# Patient Record
Sex: FEMALE | Race: WHITE | Employment: OTHER | URBAN - METROPOLITAN AREA
[De-identification: names, ages, dates, MRNs, and addresses within clinical notes are randomized per-mention and may not be internally consistent; named-entity substitution may affect disease eponyms.]

---

## 2022-08-14 ENCOUNTER — HOSPITAL ENCOUNTER (INPATIENT)
Age: 87
LOS: 5 days | Discharge: NURSING, REHAB OR LONG-TERM CARE | DRG: 378 | End: 2022-08-19
Attending: INTERNAL MEDICINE | Admitting: INTERNAL MEDICINE
Payer: MEDICARE

## 2022-08-14 ENCOUNTER — ANESTHESIA (OUTPATIENT)
Dept: INPATIENT UNIT | Age: 87
End: 2022-08-14

## 2022-08-14 ENCOUNTER — ANESTHESIA EVENT (OUTPATIENT)
Dept: INPATIENT UNIT | Age: 87
End: 2022-08-14

## 2022-08-14 DIAGNOSIS — K92.2 GASTROINTESTINAL HEMORRHAGE, UNSPECIFIED GASTROINTESTINAL HEMORRHAGE TYPE: ICD-10-CM

## 2022-08-14 DIAGNOSIS — S24.102D: Primary | ICD-10-CM

## 2022-08-14 DIAGNOSIS — S22.029D: Primary | ICD-10-CM

## 2022-08-14 PROBLEM — E78.5 HYPERLIPIDEMIA: Status: ACTIVE | Noted: 2022-08-14

## 2022-08-14 PROBLEM — K92.1 MELENA: Status: ACTIVE | Noted: 2022-08-14

## 2022-08-14 PROBLEM — D62 ANEMIA ASSOCIATED WITH ACUTE BLOOD LOSS: Status: ACTIVE | Noted: 2022-08-14

## 2022-08-14 PROBLEM — E11.65 UNCONTROLLED TYPE 2 DIABETES MELLITUS WITH HYPERGLYCEMIA (HCC): Status: ACTIVE | Noted: 2022-08-14

## 2022-08-14 LAB
ANION GAP SERPL CALCULATED.3IONS-SCNC: 9 MMOL/L (ref 7–16)
BASOPHILS ABSOLUTE: 0.05 E9/L (ref 0–0.2)
BASOPHILS RELATIVE PERCENT: 0.5 % (ref 0–2)
BUN BLDV-MCNC: 49 MG/DL (ref 6–23)
CALCIUM SERPL-MCNC: 8.2 MG/DL (ref 8.6–10.2)
CHLORIDE BLD-SCNC: 102 MMOL/L (ref 98–107)
CO2: 23 MMOL/L (ref 22–29)
CREAT SERPL-MCNC: 1.1 MG/DL (ref 0.5–1)
EOSINOPHILS ABSOLUTE: 0.01 E9/L (ref 0.05–0.5)
EOSINOPHILS RELATIVE PERCENT: 0.1 % (ref 0–6)
GFR AFRICAN AMERICAN: 57
GFR NON-AFRICAN AMERICAN: 47 ML/MIN/1.73
GLUCOSE BLD-MCNC: 229 MG/DL (ref 74–99)
HCT VFR BLD CALC: 23.5 % (ref 34–48)
HEMOGLOBIN: 7.7 G/DL (ref 11.5–15.5)
IMMATURE GRANULOCYTES #: 0.04 E9/L
IMMATURE GRANULOCYTES %: 0.4 % (ref 0–5)
LYMPHOCYTES ABSOLUTE: 2.05 E9/L (ref 1.5–4)
LYMPHOCYTES RELATIVE PERCENT: 21.1 % (ref 20–42)
MCH RBC QN AUTO: 28.3 PG (ref 26–35)
MCHC RBC AUTO-ENTMCNC: 32.8 % (ref 32–34.5)
MCV RBC AUTO: 86.4 FL (ref 80–99.9)
METER GLUCOSE: 164 MG/DL (ref 74–99)
METER GLUCOSE: 217 MG/DL (ref 74–99)
METER GLUCOSE: 238 MG/DL (ref 74–99)
MONOCYTES ABSOLUTE: 0.86 E9/L (ref 0.1–0.95)
MONOCYTES RELATIVE PERCENT: 8.9 % (ref 2–12)
NEUTROPHILS ABSOLUTE: 6.7 E9/L (ref 1.8–7.3)
NEUTROPHILS RELATIVE PERCENT: 69 % (ref 43–80)
PDW BLD-RTO: 15.2 FL (ref 11.5–15)
PLATELET # BLD: 243 E9/L (ref 130–450)
PMV BLD AUTO: 8.9 FL (ref 7–12)
POTASSIUM SERPL-SCNC: 4.3 MMOL/L (ref 3.5–5)
RBC # BLD: 2.72 E12/L (ref 3.5–5.5)
SODIUM BLD-SCNC: 134 MMOL/L (ref 132–146)
WBC # BLD: 9.7 E9/L (ref 4.5–11.5)

## 2022-08-14 PROCEDURE — 36415 COLL VENOUS BLD VENIPUNCTURE: CPT

## 2022-08-14 PROCEDURE — 6360000002 HC RX W HCPCS: Performed by: NURSE PRACTITIONER

## 2022-08-14 PROCEDURE — 82962 GLUCOSE BLOOD TEST: CPT

## 2022-08-14 PROCEDURE — 2580000003 HC RX 258: Performed by: NURSE PRACTITIONER

## 2022-08-14 PROCEDURE — C9113 INJ PANTOPRAZOLE SODIUM, VIA: HCPCS | Performed by: NURSE PRACTITIONER

## 2022-08-14 PROCEDURE — A4216 STERILE WATER/SALINE, 10 ML: HCPCS | Performed by: NURSE PRACTITIONER

## 2022-08-14 PROCEDURE — 6370000000 HC RX 637 (ALT 250 FOR IP): Performed by: NURSE PRACTITIONER

## 2022-08-14 PROCEDURE — 85025 COMPLETE CBC W/AUTO DIFF WBC: CPT

## 2022-08-14 PROCEDURE — 99232 SBSQ HOSP IP/OBS MODERATE 35: CPT | Performed by: INTERNAL MEDICINE

## 2022-08-14 PROCEDURE — 80048 BASIC METABOLIC PNL TOTAL CA: CPT

## 2022-08-14 PROCEDURE — 2060000000 HC ICU INTERMEDIATE R&B

## 2022-08-14 RX ORDER — FLUOXETINE HYDROCHLORIDE 20 MG/1
40 CAPSULE ORAL DAILY
Status: DISCONTINUED | OUTPATIENT
Start: 2022-08-14 | End: 2022-08-19 | Stop reason: HOSPADM

## 2022-08-14 RX ORDER — INSULIN LISPRO 100 [IU]/ML
0-4 INJECTION, SOLUTION INTRAVENOUS; SUBCUTANEOUS
Status: DISCONTINUED | OUTPATIENT
Start: 2022-08-14 | End: 2022-08-19 | Stop reason: HOSPADM

## 2022-08-14 RX ORDER — SODIUM CHLORIDE 9 MG/ML
INJECTION, SOLUTION INTRAVENOUS PRN
Status: DISCONTINUED | OUTPATIENT
Start: 2022-08-14 | End: 2022-08-15 | Stop reason: SDUPTHER

## 2022-08-14 RX ORDER — NAPROXEN 250 MG/1
250 TABLET ORAL 2 TIMES DAILY WITH MEALS
Status: ON HOLD | COMMUNITY
End: 2022-08-19 | Stop reason: HOSPADM

## 2022-08-14 RX ORDER — GABAPENTIN 100 MG/1
100 CAPSULE ORAL 3 TIMES DAILY
Status: DISCONTINUED | OUTPATIENT
Start: 2022-08-14 | End: 2022-08-19 | Stop reason: HOSPADM

## 2022-08-14 RX ORDER — FLUOXETINE HYDROCHLORIDE 20 MG/1
40 CAPSULE ORAL DAILY
COMMUNITY

## 2022-08-14 RX ORDER — SODIUM CHLORIDE 0.9 % (FLUSH) 0.9 %
5-40 SYRINGE (ML) INJECTION EVERY 12 HOURS SCHEDULED
Status: DISCONTINUED | OUTPATIENT
Start: 2022-08-14 | End: 2022-08-15 | Stop reason: SDUPTHER

## 2022-08-14 RX ORDER — POLYETHYLENE GLYCOL 3350 17 G/17G
17 POWDER, FOR SOLUTION ORAL DAILY
COMMUNITY

## 2022-08-14 RX ORDER — LOSARTAN POTASSIUM 50 MG/1
100 TABLET ORAL DAILY
Status: DISCONTINUED | OUTPATIENT
Start: 2022-08-14 | End: 2022-08-15

## 2022-08-14 RX ORDER — ACETAMINOPHEN 325 MG/1
650 TABLET ORAL EVERY 6 HOURS PRN
Status: DISCONTINUED | OUTPATIENT
Start: 2022-08-14 | End: 2022-08-14

## 2022-08-14 RX ORDER — MECLIZINE HYDROCHLORIDE CHEWABLE TABLETS 25 MG/1
25 TABLET, CHEWABLE ORAL AS NEEDED
COMMUNITY

## 2022-08-14 RX ORDER — GABAPENTIN 100 MG/1
100 CAPSULE ORAL 3 TIMES DAILY
COMMUNITY

## 2022-08-14 RX ORDER — ACETAMINOPHEN 650 MG/1
650 SUPPOSITORY RECTAL EVERY 6 HOURS PRN
Status: DISCONTINUED | OUTPATIENT
Start: 2022-08-14 | End: 2022-08-14

## 2022-08-14 RX ORDER — ACETAMINOPHEN 325 MG/1
650 TABLET ORAL EVERY 4 HOURS PRN
Status: DISCONTINUED | OUTPATIENT
Start: 2022-08-14 | End: 2022-08-19 | Stop reason: HOSPADM

## 2022-08-14 RX ORDER — ATORVASTATIN CALCIUM 40 MG/1
40 TABLET, FILM COATED ORAL DAILY
COMMUNITY

## 2022-08-14 RX ORDER — BUMETANIDE 0.5 MG/1
0.5 TABLET ORAL DAILY
COMMUNITY

## 2022-08-14 RX ORDER — SODIUM CHLORIDE 0.9 % (FLUSH) 0.9 %
5-40 SYRINGE (ML) INJECTION PRN
Status: DISCONTINUED | OUTPATIENT
Start: 2022-08-14 | End: 2022-08-15 | Stop reason: SDUPTHER

## 2022-08-14 RX ORDER — INSULIN LISPRO 100 [IU]/ML
0-4 INJECTION, SOLUTION INTRAVENOUS; SUBCUTANEOUS NIGHTLY
Status: DISCONTINUED | OUTPATIENT
Start: 2022-08-14 | End: 2022-08-19 | Stop reason: HOSPADM

## 2022-08-14 RX ORDER — LOSARTAN POTASSIUM 100 MG/1
100 TABLET ORAL DAILY
COMMUNITY

## 2022-08-14 RX ORDER — ATORVASTATIN CALCIUM 40 MG/1
40 TABLET, FILM COATED ORAL DAILY
Status: DISCONTINUED | OUTPATIENT
Start: 2022-08-14 | End: 2022-08-19 | Stop reason: HOSPADM

## 2022-08-14 RX ORDER — DEXTROSE MONOHYDRATE 100 MG/ML
INJECTION, SOLUTION INTRAVENOUS CONTINUOUS PRN
Status: DISCONTINUED | OUTPATIENT
Start: 2022-08-14 | End: 2022-08-19 | Stop reason: HOSPADM

## 2022-08-14 RX ORDER — POLYETHYLENE GLYCOL 3350 17 G/17G
17 POWDER, FOR SOLUTION ORAL DAILY PRN
Status: DISCONTINUED | OUTPATIENT
Start: 2022-08-14 | End: 2022-08-19 | Stop reason: HOSPADM

## 2022-08-14 RX ADMIN — SODIUM CHLORIDE 40 MG: 9 INJECTION INTRAMUSCULAR; INTRAVENOUS; SUBCUTANEOUS at 20:31

## 2022-08-14 RX ADMIN — GABAPENTIN 100 MG: 100 CAPSULE ORAL at 09:51

## 2022-08-14 RX ADMIN — GABAPENTIN 100 MG: 100 CAPSULE ORAL at 20:31

## 2022-08-14 RX ADMIN — INSULIN LISPRO 1 UNITS: 100 INJECTION, SOLUTION INTRAVENOUS; SUBCUTANEOUS at 11:28

## 2022-08-14 RX ADMIN — SODIUM CHLORIDE, PRESERVATIVE FREE 10 ML: 5 INJECTION INTRAVENOUS at 20:35

## 2022-08-14 RX ADMIN — ACETAMINOPHEN 650 MG: 325 TABLET ORAL at 09:52

## 2022-08-14 RX ADMIN — GABAPENTIN 100 MG: 100 CAPSULE ORAL at 15:18

## 2022-08-14 RX ADMIN — ACETAMINOPHEN 650 MG: 325 TABLET ORAL at 20:31

## 2022-08-14 RX ADMIN — SODIUM CHLORIDE 40 MG: 9 INJECTION INTRAMUSCULAR; INTRAVENOUS; SUBCUTANEOUS at 11:28

## 2022-08-14 RX ADMIN — SODIUM CHLORIDE, PRESERVATIVE FREE 10 ML: 5 INJECTION INTRAVENOUS at 09:52

## 2022-08-14 RX ADMIN — DESMOPRESSIN ACETATE 40 MG: 0.2 TABLET ORAL at 09:52

## 2022-08-14 RX ADMIN — LOSARTAN POTASSIUM 100 MG: 50 TABLET, FILM COATED ORAL at 09:52

## 2022-08-14 RX ADMIN — FLUOXETINE HYDROCHLORIDE 40 MG: 20 CAPSULE ORAL at 09:51

## 2022-08-14 RX ADMIN — INSULIN LISPRO 1 UNITS: 100 INJECTION, SOLUTION INTRAVENOUS; SUBCUTANEOUS at 16:13

## 2022-08-14 RX ADMIN — SODIUM CHLORIDE, PRESERVATIVE FREE 10 ML: 5 INJECTION INTRAVENOUS at 10:59

## 2022-08-14 ASSESSMENT — PAIN SCALES - GENERAL
PAINLEVEL_OUTOF10: 5
PAINLEVEL_OUTOF10: 0
PAINLEVEL_OUTOF10: 3
PAINLEVEL_OUTOF10: 0

## 2022-08-14 ASSESSMENT — PAIN DESCRIPTION - LOCATION: LOCATION: BACK;LEG

## 2022-08-14 NOTE — CONSULTS
Gastroenterology Consult Note   Sally WONG, NP-C with Darren Bernheim, M.D. Consult Note        Date of Service: 8/14/2022  Reason for Consult: GIB  Requesting Physician: Luke Diego    CHIEF COMPLAINT:  recent falls    History Obtained From:  patient, family member - son at [facility], [address] medical record    HISTORY OF PRESENT ILLNESS:       Shirlene Bennett is a 80 y.o. female with significant past medical history of anxiety, dementia, DM2, HTN, GERD, HLD, and vertigo direct admit from [facility] AC[facility] for multiple falls at [facility]. Pt reports to recent falls. States she has been having dark stools for the last several months. Describes the stools as \"loose, black\". States her bowels are always on the looser side. Denies any prior episodes of such. With a good appetite, denies any recent weight loss. Denies any fever and/or chills. Also denies any abdominal pain. Patient has been taking Naproxen 250 MG BID at [facility]. Prior EGD years ago, uncertain of any details or whom completes this. Last colon over 5 years ago, polyps removed. Denies any FMHx of colon cancer. Admission labs pending. Consultation for GIB. Currently, pt reports to feeling sore, wanting to get out of the bed. Has been NPO.   Labs today pending    Past Medical History:        Diagnosis Date    Anxiety     Dementia ([facility] [address]     DM II (diabetes mellitus, type II), controlled ([facility] [address]     Essential (primary) hypertension     GERD (gastroesophageal reflux disease)     HLD (hyperlipidemia)     Vertigo      Past Surgical History:        Procedure Laterality Date    BREAST SURGERY Left     ORIF FEMUR FRACTURE Right      Current Medications:    Current Facility-Administered Medications: atorvastatin (LIPITOR) tablet 40 mg, 40 mg, Oral, Daily  FLUoxetine (PROZAC) capsule 40 mg, 40 mg, Oral, Daily  gabapentin (NEURONTIN) capsule 100 mg, 100 mg, Oral, TID  losartan (COZAAR) tablet 100 mg, 100 mg, Oral, Daily  sodium chloride flush 0.9 % injection 5-40 mL, 5-40 mL, IntraVENous, 2 times per day  sodium chloride flush 0.9 % injection 5-40 mL, 5-40 mL, IntraVENous, PRN  0.9 % sodium chloride infusion, , IntraVENous, PRN  polyethylene glycol (GLYCOLAX) packet 17 g, 17 g, Oral, Daily PRN  glucose chewable tablet 16 g, 4 tablet, Oral, PRN  dextrose bolus 10% 125 mL, 125 mL, IntraVENous, PRN **OR** dextrose bolus 10% 250 mL, 250 mL, IntraVENous, PRN  glucagon (rDNA) injection 1 mg, 1 mg, SubCUTAneous, PRN  dextrose 10 % infusion, , IntraVENous, Continuous PRN  insulin lispro (HUMALOG) injection vial 0-4 Units, 0-4 Units, SubCUTAneous, TID WC  insulin lispro (HUMALOG) injection vial 0-4 Units, 0-4 Units, SubCUTAneous, Nightly  acetaminophen (TYLENOL) tablet 650 mg, 650 mg, Oral, Q4H PRN    Allergies:  Codeine, Demerol hcl [meperidine], Hydrocodone-acetaminophen, and Percocet [oxycodone-acetaminophen]    Social History:  Patient denies any ETOH, tobacco or drug use      Family History:   Father- D/ heart issues  Mother- D/ MVA  2 brothers- D/ uncertain of causes  2 brothers- A/H  2 boys- A/H  2 daughters- 1  from lung & brain CA; other A/H    REVIEW OF SYSTEMS:    Aside from what was mentioned in the PMH and HPI, essentially unremarkable, all others negative. PHYSICAL EXAM:      Vitals:    BP (!) 166/64   Pulse 67   Temp 98.3 °F (36.8 °C) (Oral)   Resp 18   SpO2 97%       CONSTITUTIONAL:  awake, alert, cooperative, no apparent distress, and appears stated age, ill-appearing  EYES:  pupils equal, round and reactive to light, sclera anicteric and conjunctiva normal  ENT:  normocephalic, oral pharynx with moist mucous membranes  LUNGS:  clear/ diminished to auscultation bilaterally.   CARDIOVASCULAR:   regular rate and rhythm, no murmur noted; 2+ pulses; no edema  ABDOMEN:  normal bowel sounds, soft, non-distended, epigastric tenderness to palpitation, no guarding or rebound  NEUROLOGIC:  Mental Status Exam:  Level of Alertness:   awake  Orientation:   person, place, time  Motor Exam:  Motor exam is symmetrical 5 out of 5 all extremities bilaterally  SKIN:  pale skin color, texture, turgor    DATA:    Labs ordered. CT A/P- no pathology noted- per [facility]    IMPRESSION:    Melanotic stools- +occult at [facility]  Anemia- reportedly hemoglobin 5.5 at [facility]  Epigastric pain  Excess NSAID use  PMHx of colon polyps  ASYA mass- per CT from [facility]  Recent falls    RECOMMENDATIONS:      Protonix 40 MG IV BID  AM labs pending  Diet as tolerated for today  NPO P MN  EGD tomorrow with Dr. Parker Delgado. Procedure details for EGD discussed in detail. Complications including but not limited to, perforation, bleeding and infection were discussed in great detail. Risks, benefits, and alternatives explained. Pt has understood the information and has agreed to proceed. Serial H&H, transfuse >7 per Primary Care  Supportive care  Trend labs   Will follow    Thank you very much for your consultation. We will follow closely with you.     Discussed with Dr. Chrissie Ramon developed by Dr. Kale Urbina, NP-C 8/14/2022 9:00 AM for Dr. Parker Delgado

## 2022-08-14 NOTE — ANESTHESIA PRE PROCEDURE
Department of Anesthesiology  Preprocedure Note       Name:  Fauzia Manning   Age:  80 y.o.  :  1933                                          MRN:  [id number]         Date:  2022      Surgeon: * No surgeons listed *    Procedure: * No procedures listed *    Medications prior to admission:   Prior to Admission medications    Medication Sig Start Date End Date Taking? Authorizing Provider   atorvastatin (LIPITOR) 40 MG tablet Take 40 mg by mouth in the morning. At bedtime. Yes Historical Provider, MD   bumetanide (BUMEX) 0.5 MG tablet Take 0.5 mg by mouth in the morning. In the morning. Yes Historical Provider, MD   FLUoxetine (PROZAC) 20 MG capsule Take 40 mg by mouth in the morning. In the morning. Yes Historical Provider, MD   polyethylene glycol (GLYCOLAX) 17 GM/SCOOP powder Take 17 g by mouth daily   Yes Historical Provider, MD   losartan (COZAAR) 100 MG tablet Take 100 mg by mouth in the morning. In the morning. Yes Historical Provider, MD   Cholecalciferol (VITAMIN D3) 125 MCG (5000 UT) TABS Take by mouth once a week   Yes Historical Provider, MD   naproxen (NAPROSYN) 250 MG tablet Take 250 mg by mouth in the morning and 250 mg in the evening. Take with meals. Yes Historical Provider, MD   gabapentin (NEURONTIN) 100 MG capsule Take 100 mg by mouth in the morning and 100 mg at noon and 100 mg before bedtime.    Yes Historical Provider, MD   meclizine (ANTIVERT) 25 MG CHEW Take 25 mg by mouth as needed   Yes Historical Provider, MD       Current medications:    Current Facility-Administered Medications   Medication Dose Route Frequency Provider Last Rate Last Admin    atorvastatin (LIPITOR) tablet 40 mg  40 mg Oral Daily Floyd Nine, APRN - CNP   40 mg at 22 0952    FLUoxetine (PROZAC) capsule 40 mg  40 mg Oral Daily Floyd Nine, APRN - CNP   40 mg at 22 0928    gabapentin (NEURONTIN) capsule 100 mg  100 mg Oral TID Floyd Nine, APRN - CNP   100 mg at 08/14/22 1518    losartan (COZAAR) tablet 100 mg  100 mg Oral Daily Carmel Chavarria, APRN - CNP   100 mg at 08/14/22 5534    sodium chloride flush 0.9 % injection 5-40 mL  5-40 mL IntraVENous 2 times per day Eliarceliaa Millies, APRN - CNP   10 mL at 08/14/22 9916    sodium chloride flush 0.9 % injection 5-40 mL  5-40 mL IntraVENous PRN Eliarceliaa Millies, APRN - CNP   10 mL at 08/14/22 1059    0.9 % sodium chloride infusion   IntraVENous PRN Eliarceliaa Millies, APRN - CNP        polyethylene glycol (GLYCOLAX) packet 17 g  17 g Oral Daily PRN Sharondaa Millies, APRN - CNP        glucose chewable tablet 16 g  4 tablet Oral PRN Eliarceliaa Millies, APRN - CNP        dextrose bolus 10% 125 mL  125 mL IntraVENous PRN Eliarceliaa Millies, APRN - CNP        Or    dextrose bolus 10% 250 mL  250 mL IntraVENous PRN Sharondaa Millies, APRN - CNP        glucagon (rDNA) injection 1 mg  1 mg SubCUTAneous PRN Sharondaa Millies, APRN - CNP        dextrose 10 % infusion   IntraVENous Continuous PRN Carmel Chavarria, APRN - CNP        insulin lispro (HUMALOG) injection vial 0-4 Units  0-4 Units SubCUTAneous TID WC Sharondaa Aura, APRN - CNP   1 Units at 08/14/22 1613    insulin lispro (HUMALOG) injection vial 0-4 Units  0-4 Units SubCUTAneous Nightly Sharondaa Millies, APRN - CNP        acetaminophen (TYLENOL) tablet 650 mg  650 mg Oral Q4H PRN Sharondaa Millies, APRN - CNP   650 mg at 08/14/22 0952    pantoprazole (PROTONIX) 40 mg in sodium chloride (PF) 10 mL injection  40 mg IntraVENous BID Sally A Sugar, APRN - CNP   40 mg at 08/14/22 1128       Allergies:     Allergies   Allergen Reactions    Codeine     Demerol Hcl [Meperidine]     Hydrocodone-Acetaminophen     Percocet [Oxycodone-Acetaminophen]        Problem List:    Patient Active Problem List   Diagnosis Code    GI bleed K92.2    Melena K92.1    Anemia associated with acute blood loss D62    Uncontrolled type 2 diabetes mellitus with hyperglycemia ([facility] [address] E11.65    Hyperlipidemia E78.5       Past Medical History:        Diagnosis Date    Anxiety     [facility]     DM II (diabetes mellitus, type II), controlled ([facility] [address] [facility]     Essential (primary) hypertension     GERD (gastroesophageal reflux disease)     HLD (hyperlipidemia)     Vertigo        Past Surgical History:        Procedure Laterality Date    BREAST SURGERY Left     ORIF FEMUR FRACTURE Right        Social History:    Social History     Tobacco Use    Smoking status: Former     Types: Cigarettes    Smokeless tobacco: Never   Substance Use Topics    Alcohol use: Never                                Counseling given: Not Answered      Vital Signs (Current):   Vitals:    08/14/22 0637 08/14/22 0800 08/14/22 1359   BP: (!) 169/75 (!) 166/64 (!) 111/40   Pulse: 68 67 61   Resp: 18 18 16   Temp: 98.4 °F (36.9 °C) 98.3 °F (36.8 °C) 99.3 °F (37.4 °C)   TempSrc: Oral Oral Axillary   SpO2: 96% 97% 95%                                              BP Readings from Last 3 Encounters:   08/14/22 (!) 111/40       NPO Status:                                                                                 BMI:   Wt Readings from Last 3 Encounters:   No data found for Wt     There is no height or weight on file to calculate BMI.    CBC:   Lab Results   Component Value Date/Time    WBC 9.7 08/14/2022 10:50 AM    RBC 2.72 08/14/2022 10:50 AM    HGB 7.7 08/14/2022 10:50 AM    HCT 23.5 08/14/2022 10:50 AM    MCV 86.4 08/14/2022 10:50 AM    RDW 15.2 08/14/2022 10:50 AM     08/14/2022 10:50 AM       CMP:   Lab Results   Component Value Date/Time     08/14/2022 10:50 AM    K 4.3 08/14/2022 10:50 AM     08/14/2022 10:50 AM    CO2 23 08/14/2022 10:50 AM    BUN 49 08/14/2022 10:50 AM    CREATININE 1.1 08/14/2022 10:50 AM    GFRAA 57 08/14/2022 10:50 AM    LABGLOM 47 08/14/2022 10:50 AM    GLUCOSE 229 08/14/2022 10:50 AM    CALCIUM 8.2 08/14/2022 10:50 AM       POC Tests: No results for input(s): ASHA LOPEZ POCK,

## 2022-08-14 NOTE — H&P
[address] [facility] Hospitalist Group   History and Physical      CHIEF COMPLAINT:  Fall     History of Present Illness:  80 y.o. female with a history of Dm II, HTN, Vertigo, Dementia, and  GERD presents from [facility] following fall at [facility]. Pt complaint is moderate in severity, intermittent, worsened by nothing. Pt presented to [facility] following fall at facility 0010. VSS upon presentation to ED. Noted Nausea and Hgb 5.5. Rectal exam performed revealing melatonic fecal occult stools. Pantoprazole infusion initiated. Completed 2U PRBC. She was transferred to [address] [place] [address] For further evaluation and treatment. Pt son at bedside. Pt states she has been falling a lot recently. Last night as the final fall. She denies any dizziness, lightheadedness. States she has noticed dark stools for long time, but unsure when they exactly started. Is prescribed Naproxen 250mg BID at facility. She denies CP, SOB, fevers, Chills, N/V/D. CT A/P No acute pathology. CT Head No acute intracranial pathology. Prominent CSF spaces usually indicate volume loss/atrophy as a manifestation of mild to moderate chronic white matter ischemia. CXR 3.7cm Opacity in ASYA may be underlying mass. CT Cervical Spine No acute osseous injury. MIld Degenerative changes C5-C6. C6-C7. CTs all performed at [facility] film available in soft chart. Decision to admit pt for further evaluation and treatment. Informant(s) for H&P:Patient, Son, and EMR    REVIEW OF SYSTEMS:  Admits to falls and dark stools. Denies fevers, chills, cp, sob, n/v, ha, vision/hearing changes, wt changes, hot/cold flashes, other open skin lesions, diarrhea, constipation, dysuria/hematuria unless noted in HPI. Complete ROS performed with the patient and is otherwise negative.       PMH:  Past Medical History:   Diagnosis Date    Anxiety     Dementia ([facility] [address]     DM II (diabetes mellitus, type II), controlled ([address]     Essential (primary) hypertension     GERD (gastroesophageal reflux disease)     HLD (hyperlipidemia)     Vertigo        Surgical History:  Past Surgical History:   Procedure Laterality Date    BREAST SURGERY Left     ORIF FEMUR FRACTURE Right        Medications Prior to Admission:    Prior to Admission medications    Medication Sig Start Date End Date Taking? Authorizing Provider   atorvastatin (LIPITOR) 40 MG tablet Take 40 mg by mouth in the morning. At bedtime. Yes Historical Provider, MD   bumetanide (BUMEX) 0.5 MG tablet Take 0.5 mg by mouth in the morning. In the morning. Yes Historical Provider, MD   FLUoxetine (PROZAC) 20 MG capsule Take 40 mg by mouth in the morning. In the morning. Yes Historical Provider, MD   polyethylene glycol (GLYCOLAX) 17 GM/SCOOP powder Take 17 g by mouth daily   Yes Historical Provider, MD   losartan (COZAAR) 100 MG tablet Take 100 mg by mouth in the morning. In the morning. Yes Historical Provider, MD   Cholecalciferol (VITAMIN D3) 125 MCG (5000 UT) TABS Take by mouth once a week   Yes Historical Provider, MD   naproxen (NAPROSYN) 250 MG tablet Take 250 mg by mouth in the morning and 250 mg in the evening. Take with meals. Yes Historical Provider, MD   gabapentin (NEURONTIN) 100 MG capsule Take 100 mg by mouth in the morning and 100 mg at noon and 100 mg before bedtime. Yes Historical Provider, MD   meclizine (ANTIVERT) 25 MG CHEW Take 25 mg by mouth as needed   Yes Historical Provider, MD       Allergies:    Codeine, Demerol hcl [meperidine], Hydrocodone-acetaminophen, and Percocet [oxycodone-acetaminophen]    Social History:    reports that she has quit smoking. Her smoking use included cigarettes. She has never used smokeless tobacco. She reports that she does not drink alcohol and does not use drugs. Family History:   family history includes Other in her son. Son with noted Factor V Leiden.  Reviewed and updated with pt and son    PHYSICAL EXAM:  Vitals:  BP (!) 166/64   Pulse 67   Temp 98.3 °F (36.8 °C) (Oral)   Resp 18   SpO2 97%     General Appearance: alert and oriented to person, place and time and in no acute distress  Skin: warm and dry  Head: normocephalic and atraumatic  Eyes: pupils equal, round, and reactive to light, extraocular eye movements intact, conjunctivae normal  Neck: neck supple and non tender without mass   Pulmonary/Chest: clear to auscultation bilaterally- no wheezes, rales or rhonchi, normal air movement, no respiratory distress  Cardiovascular: normal rate, normal S1 and S2 and no RGM  Abdomen: soft, non-tender, non-distended, normal bowel sounds  Extremities: no cyanosis, no clubbing and no edema  Neurologic: no cranial nerve deficit and speech normal    LABS:  No results for input(s): NA, K, CL, CO2, BUN, CREATININE, GLUCOSE, CALCIUM in the last 72 hours. No results for input(s): WBC, RBC, HGB, HCT, MCV, MCH, MCHC, RDW, PLT, MPV in the last 72 hours. No results for input(s): POCGLU in the last 72 hours. Radiology:   CT A/P  Impression: No acute pathology. CT Head Impression: No acute intracranial pathology. Prominent CSF spaces usually indicate volume loss/atrophy as a manifestation of mild to moderate chronic white matter ischemia. CXR Impression: 3.7cm Opacity in ASYA may be underlying mass. CT Cervical Spine Impression:  No acute osseous injury. MIld Degenerative changes C5-C6. C6-C7. CTs all performed at [facility] film available in soft chart. EKG: None    ASSESSMENT:      Principal Problem:    GI bleed  Resolved Problems:    * No resolved hospital problems. *      PLAN:    GI Bleed- Noted  dark stools for unknown length of time. Upon presentation to [place] ED Does take naproxen 250mg BID. Hgb 5.5. Received 2U PRBC. Pantoprazole infusion initiated. Transferred to [facility] for specialty evaluation. Trend H/H. Continue Pantoprazole. GI input appreciated. Recent Fall- likely 2/2 above. Pt states she has had numerous falls.  Does have Hx vertigo, however pt states she does not recall being dizzy/lightheaded at time of fall. Check orthostatic bp. PT/OT. ASYA Lung Opacity- CXR 3.7cm Opacity in ASYA may be underlying mass. Labs pending. Consider Pulmonology input. HTN- Continue losartan. Holding Bumetanide. DM II- Check A1C. SSI/Hypoglycemic protocol/POCGT/CLD. Resume Carb control diet when ok with GI Follow adjust as needed. HLD- Atorvastatin. Code Status: CCA  DVT prophylaxis: SCDs    NOTE: This report was transcribed using voice recognition software. Every effort was made to ensure accuracy; however, inadvertent computerized transcription errors may be present. Electronically signed by Krish Alcantar CNP on 8/14/2022 at 9:51 AM  HOSPITALIST ATTENDING PHYSICIAN NOTE 8/14/2022 1504PM:    Details of the evaluation - subjective assessment (including medication profile, past medical, family and social history when applicable), examination, review of lab and test data, diagnostic impressions and medical decision making - performed by Aníbal Walter. Calderon Alcantar CNP, were discussed with me on the date of service and I agree with clinical information herein unless otherwise noted. The patient has been evaluated by me personally earlier today. Pt reports no fevers, chills,n/v. PHYSICAL EXAM:    Vitals:  BP (!) 111/40   Pulse 61   Temp 99.3 °F (37.4 °C) (Axillary)   Resp 16   SpO2 95%     General:  Appears comfortable. Answers questions appropriately and cooperative with exam  HEENT:  Mucous membranes moist. No erythema, rhinorrhea, or post-nasal drip noted. Neck:  No carotid bruits. Heart:  Rhythm regular at rate of 60  Lungs:  CTA. No wheeze, rales, or rhonchi  Abdomen:  Positive bowel sounds positive. Soft. Non-tender. No guarding, rebound or rigidity. Breast/Rectal/Genitourinary: not pertinent.     Extremities:  Negative for lower extremity edema  Skin:  Warm and dry  Vascular: 2/4 Dorsalis Pedis pulses bilaterally. Neuro:  Cranial nerves 2-12 grossly intact, no focal weakness or change in sensation noted. Extraocular muscles intact. Pupils equal, round, reactive to light. I agree with the assessment and plan of Marcia Rosales. Patrecia Alton - CNP    Melena/Gi bleed  Anemia with acute blood component  Dm type 2 uncontrolled  Htn  hyperlipidemia    Electronically signed by Andrea Ireland D.O.   Hospitalist  [facility] Hospitalist Service at [facility]

## 2022-08-15 ENCOUNTER — ANESTHESIA (OUTPATIENT)
Dept: ENDOSCOPY | Age: 87
DRG: 378 | End: 2022-08-15
Payer: MEDICARE

## 2022-08-15 ENCOUNTER — ANESTHESIA EVENT (OUTPATIENT)
Dept: ENDOSCOPY | Age: 87
DRG: 378 | End: 2022-08-15
Payer: MEDICARE

## 2022-08-15 LAB
ANION GAP SERPL CALCULATED.3IONS-SCNC: 6 MMOL/L (ref 7–16)
BASOPHILS ABSOLUTE: 0.07 E9/L (ref 0–0.2)
BASOPHILS RELATIVE PERCENT: 0.8 % (ref 0–2)
BUN BLDV-MCNC: 48 MG/DL (ref 6–23)
CALCIUM SERPL-MCNC: 8.5 MG/DL (ref 8.6–10.2)
CHLORIDE BLD-SCNC: 105 MMOL/L (ref 98–107)
CO2: 22 MMOL/L (ref 22–29)
CREAT SERPL-MCNC: 1.5 MG/DL (ref 0.5–1)
EOSINOPHILS ABSOLUTE: 0.3 E9/L (ref 0.05–0.5)
EOSINOPHILS RELATIVE PERCENT: 3.5 % (ref 0–6)
GFR AFRICAN AMERICAN: 40
GFR NON-AFRICAN AMERICAN: 33 ML/MIN/1.73
GLUCOSE BLD-MCNC: 141 MG/DL (ref 74–99)
HCT VFR BLD CALC: 23.2 % (ref 34–48)
HEMOGLOBIN: 7.5 G/DL (ref 11.5–15.5)
IMMATURE GRANULOCYTES #: 0.03 E9/L
IMMATURE GRANULOCYTES %: 0.4 % (ref 0–5)
INR BLD: 1
LYMPHOCYTES ABSOLUTE: 1.63 E9/L (ref 1.5–4)
LYMPHOCYTES RELATIVE PERCENT: 19 % (ref 20–42)
MCH RBC QN AUTO: 29 PG (ref 26–35)
MCHC RBC AUTO-ENTMCNC: 32.3 % (ref 32–34.5)
MCV RBC AUTO: 89.6 FL (ref 80–99.9)
METER GLUCOSE: 153 MG/DL (ref 74–99)
METER GLUCOSE: 157 MG/DL (ref 74–99)
METER GLUCOSE: 171 MG/DL (ref 74–99)
METER GLUCOSE: 262 MG/DL (ref 74–99)
MONOCYTES ABSOLUTE: 0.79 E9/L (ref 0.1–0.95)
MONOCYTES RELATIVE PERCENT: 9.2 % (ref 2–12)
NEUTROPHILS ABSOLUTE: 5.74 E9/L (ref 1.8–7.3)
NEUTROPHILS RELATIVE PERCENT: 67.1 % (ref 43–80)
PDW BLD-RTO: 16 FL (ref 11.5–15)
PLATELET # BLD: 230 E9/L (ref 130–450)
PMV BLD AUTO: 8.9 FL (ref 7–12)
POTASSIUM REFLEX MAGNESIUM: 4.5 MMOL/L (ref 3.5–5)
PROTHROMBIN TIME: 11.5 SEC (ref 9.3–12.4)
RBC # BLD: 2.59 E12/L (ref 3.5–5.5)
SODIUM BLD-SCNC: 133 MMOL/L (ref 132–146)
WBC # BLD: 8.6 E9/L (ref 4.5–11.5)

## 2022-08-15 PROCEDURE — 80048 BASIC METABOLIC PNL TOTAL CA: CPT

## 2022-08-15 PROCEDURE — 7100000010 HC PHASE II RECOVERY - FIRST 15 MIN: Performed by: INTERNAL MEDICINE

## 2022-08-15 PROCEDURE — 2580000003 HC RX 258: Performed by: INTERNAL MEDICINE

## 2022-08-15 PROCEDURE — 2709999900 HC NON-CHARGEABLE SUPPLY: Performed by: INTERNAL MEDICINE

## 2022-08-15 PROCEDURE — 85025 COMPLETE CBC W/AUTO DIFF WBC: CPT

## 2022-08-15 PROCEDURE — 6370000000 HC RX 637 (ALT 250 FOR IP): Performed by: INTERNAL MEDICINE

## 2022-08-15 PROCEDURE — 6360000002 HC RX W HCPCS: Performed by: NURSE ANESTHETIST, CERTIFIED REGISTERED

## 2022-08-15 PROCEDURE — 82962 GLUCOSE BLOOD TEST: CPT

## 2022-08-15 PROCEDURE — 6360000002 HC RX W HCPCS: Performed by: INTERNAL MEDICINE

## 2022-08-15 PROCEDURE — 0DB68ZX EXCISION OF STOMACH, VIA NATURAL OR ARTIFICIAL OPENING ENDOSCOPIC, DIAGNOSTIC: ICD-10-PCS | Performed by: INTERNAL MEDICINE

## 2022-08-15 PROCEDURE — 2580000003 HC RX 258: Performed by: NURSE PRACTITIONER

## 2022-08-15 PROCEDURE — 97530 THERAPEUTIC ACTIVITIES: CPT

## 2022-08-15 PROCEDURE — 87081 CULTURE SCREEN ONLY: CPT

## 2022-08-15 PROCEDURE — C9113 INJ PANTOPRAZOLE SODIUM, VIA: HCPCS | Performed by: NURSE PRACTITIONER

## 2022-08-15 PROCEDURE — 99232 SBSQ HOSP IP/OBS MODERATE 35: CPT | Performed by: INTERNAL MEDICINE

## 2022-08-15 PROCEDURE — C9113 INJ PANTOPRAZOLE SODIUM, VIA: HCPCS | Performed by: INTERNAL MEDICINE

## 2022-08-15 PROCEDURE — 6360000002 HC RX W HCPCS: Performed by: NURSE PRACTITIONER

## 2022-08-15 PROCEDURE — 3700000000 HC ANESTHESIA ATTENDED CARE: Performed by: INTERNAL MEDICINE

## 2022-08-15 PROCEDURE — 36415 COLL VENOUS BLD VENIPUNCTURE: CPT

## 2022-08-15 PROCEDURE — 2060000000 HC ICU INTERMEDIATE R&B

## 2022-08-15 PROCEDURE — A4216 STERILE WATER/SALINE, 10 ML: HCPCS | Performed by: INTERNAL MEDICINE

## 2022-08-15 PROCEDURE — A4216 STERILE WATER/SALINE, 10 ML: HCPCS | Performed by: NURSE PRACTITIONER

## 2022-08-15 PROCEDURE — 6360000002 HC RX W HCPCS: Performed by: ANESTHESIOLOGY

## 2022-08-15 PROCEDURE — 88305 TISSUE EXAM BY PATHOLOGIST: CPT

## 2022-08-15 PROCEDURE — 7100000011 HC PHASE II RECOVERY - ADDTL 15 MIN: Performed by: INTERNAL MEDICINE

## 2022-08-15 PROCEDURE — 6360000002 HC RX W HCPCS

## 2022-08-15 PROCEDURE — 97161 PT EVAL LOW COMPLEX 20 MIN: CPT

## 2022-08-15 PROCEDURE — 85610 PROTHROMBIN TIME: CPT

## 2022-08-15 PROCEDURE — 3609012400 HC EGD TRANSORAL BIOPSY SINGLE/MULTIPLE: Performed by: INTERNAL MEDICINE

## 2022-08-15 PROCEDURE — 3700000001 HC ADD 15 MINUTES (ANESTHESIA): Performed by: INTERNAL MEDICINE

## 2022-08-15 PROCEDURE — 0DB98ZX EXCISION OF DUODENUM, VIA NATURAL OR ARTIFICIAL OPENING ENDOSCOPIC, DIAGNOSTIC: ICD-10-PCS | Performed by: INTERNAL MEDICINE

## 2022-08-15 PROCEDURE — 2580000003 HC RX 258: Performed by: ANESTHESIOLOGY

## 2022-08-15 PROCEDURE — 6370000000 HC RX 637 (ALT 250 FOR IP): Performed by: NURSE PRACTITIONER

## 2022-08-15 RX ORDER — LABETALOL HYDROCHLORIDE 5 MG/ML
5 INJECTION, SOLUTION INTRAVENOUS
Status: DISCONTINUED | OUTPATIENT
Start: 2022-08-15 | End: 2022-08-19 | Stop reason: HOSPADM

## 2022-08-15 RX ORDER — ONDANSETRON 2 MG/ML
4 INJECTION INTRAMUSCULAR; INTRAVENOUS ONCE
Status: COMPLETED | OUTPATIENT
Start: 2022-08-15 | End: 2022-08-15

## 2022-08-15 RX ORDER — ONDANSETRON 2 MG/ML
INJECTION INTRAMUSCULAR; INTRAVENOUS
Status: COMPLETED
Start: 2022-08-15 | End: 2022-08-15

## 2022-08-15 RX ORDER — SODIUM CHLORIDE 0.9 % (FLUSH) 0.9 %
5-40 SYRINGE (ML) INJECTION PRN
Status: DISCONTINUED | OUTPATIENT
Start: 2022-08-15 | End: 2022-08-19 | Stop reason: HOSPADM

## 2022-08-15 RX ORDER — PROPOFOL 10 MG/ML
INJECTION, EMULSION INTRAVENOUS PRN
Status: DISCONTINUED | OUTPATIENT
Start: 2022-08-15 | End: 2022-08-15 | Stop reason: SDUPTHER

## 2022-08-15 RX ORDER — HYDRALAZINE HYDROCHLORIDE 20 MG/ML
5 INJECTION INTRAMUSCULAR; INTRAVENOUS
Status: DISCONTINUED | OUTPATIENT
Start: 2022-08-15 | End: 2022-08-19 | Stop reason: HOSPADM

## 2022-08-15 RX ORDER — SODIUM CHLORIDE 9 MG/ML
INJECTION, SOLUTION INTRAVENOUS PRN
Status: DISCONTINUED | OUTPATIENT
Start: 2022-08-15 | End: 2022-08-19 | Stop reason: HOSPADM

## 2022-08-15 RX ORDER — FENTANYL CITRATE 50 UG/ML
25 INJECTION, SOLUTION INTRAMUSCULAR; INTRAVENOUS EVERY 5 MIN PRN
Status: DISCONTINUED | OUTPATIENT
Start: 2022-08-15 | End: 2022-08-19

## 2022-08-15 RX ORDER — PROCHLORPERAZINE EDISYLATE 5 MG/ML
5 INJECTION INTRAMUSCULAR; INTRAVENOUS
Status: ACTIVE | OUTPATIENT
Start: 2022-08-15 | End: 2022-08-15

## 2022-08-15 RX ORDER — DIPHENHYDRAMINE HYDROCHLORIDE 50 MG/ML
12.5 INJECTION INTRAMUSCULAR; INTRAVENOUS
Status: ACTIVE | OUTPATIENT
Start: 2022-08-15 | End: 2022-08-15

## 2022-08-15 RX ORDER — SODIUM CHLORIDE 0.9 % (FLUSH) 0.9 %
5-40 SYRINGE (ML) INJECTION EVERY 12 HOURS SCHEDULED
Status: DISCONTINUED | OUTPATIENT
Start: 2022-08-15 | End: 2022-08-19 | Stop reason: HOSPADM

## 2022-08-15 RX ORDER — SODIUM CHLORIDE 9 MG/ML
INJECTION, SOLUTION INTRAVENOUS CONTINUOUS
Status: DISCONTINUED | OUTPATIENT
Start: 2022-08-15 | End: 2022-08-19

## 2022-08-15 RX ADMIN — FENTANYL CITRATE 25 MCG: 50 INJECTION, SOLUTION INTRAMUSCULAR; INTRAVENOUS at 20:26

## 2022-08-15 RX ADMIN — GABAPENTIN 100 MG: 100 CAPSULE ORAL at 13:32

## 2022-08-15 RX ADMIN — FLUOXETINE HYDROCHLORIDE 40 MG: 20 CAPSULE ORAL at 08:48

## 2022-08-15 RX ADMIN — SODIUM CHLORIDE: 9 INJECTION, SOLUTION INTRAVENOUS at 12:49

## 2022-08-15 RX ADMIN — ONDANSETRON 4 MG: 2 INJECTION INTRAMUSCULAR; INTRAVENOUS at 14:41

## 2022-08-15 RX ADMIN — LOSARTAN POTASSIUM 100 MG: 50 TABLET, FILM COATED ORAL at 08:48

## 2022-08-15 RX ADMIN — SODIUM CHLORIDE 40 MG: 9 INJECTION INTRAMUSCULAR; INTRAVENOUS; SUBCUTANEOUS at 20:26

## 2022-08-15 RX ADMIN — HYDRALAZINE HYDROCHLORIDE 5 MG: 20 INJECTION INTRAMUSCULAR; INTRAVENOUS at 20:25

## 2022-08-15 RX ADMIN — Medication 10 ML: at 20:27

## 2022-08-15 RX ADMIN — DESMOPRESSIN ACETATE 40 MG: 0.2 TABLET ORAL at 08:48

## 2022-08-15 RX ADMIN — PROPOFOL 120 MG: 10 INJECTION, EMULSION INTRAVENOUS at 14:50

## 2022-08-15 RX ADMIN — ACETAMINOPHEN 650 MG: 325 TABLET ORAL at 22:36

## 2022-08-15 RX ADMIN — GABAPENTIN 100 MG: 100 CAPSULE ORAL at 08:49

## 2022-08-15 RX ADMIN — GABAPENTIN 100 MG: 100 CAPSULE ORAL at 20:26

## 2022-08-15 RX ADMIN — SODIUM CHLORIDE 40 MG: 9 INJECTION INTRAMUSCULAR; INTRAVENOUS; SUBCUTANEOUS at 08:49

## 2022-08-15 RX ADMIN — SODIUM CHLORIDE, PRESERVATIVE FREE 10 ML: 5 INJECTION INTRAVENOUS at 08:50

## 2022-08-15 ASSESSMENT — PAIN DESCRIPTION - DESCRIPTORS
DESCRIPTORS: ACHING;SHARP
DESCRIPTORS: ACHING

## 2022-08-15 ASSESSMENT — PAIN DESCRIPTION - ORIENTATION
ORIENTATION: LEFT
ORIENTATION: LOWER;POSTERIOR

## 2022-08-15 ASSESSMENT — PAIN DESCRIPTION - ONSET: ONSET: ON-GOING

## 2022-08-15 ASSESSMENT — PAIN SCALES - GENERAL
PAINLEVEL_OUTOF10: 8
PAINLEVEL_OUTOF10: 0
PAINLEVEL_OUTOF10: 8
PAINLEVEL_OUTOF10: 0

## 2022-08-15 ASSESSMENT — PAIN - FUNCTIONAL ASSESSMENT
PAIN_FUNCTIONAL_ASSESSMENT: PREVENTS OR INTERFERES WITH MANY ACTIVE NOT PASSIVE ACTIVITIES
PAIN_FUNCTIONAL_ASSESSMENT: PREVENTS OR INTERFERES WITH ALL ACTIVE AND SOME PASSIVE ACTIVITIES

## 2022-08-15 ASSESSMENT — PAIN DESCRIPTION - LOCATION
LOCATION: BACK;NECK
LOCATION: LEG

## 2022-08-15 ASSESSMENT — PAIN DESCRIPTION - FREQUENCY: FREQUENCY: CONTINUOUS

## 2022-08-15 ASSESSMENT — LIFESTYLE VARIABLES: SMOKING_STATUS: 0

## 2022-08-15 ASSESSMENT — PAIN DESCRIPTION - PAIN TYPE: TYPE: CHRONIC PAIN

## 2022-08-15 NOTE — PROGRESS NOTES
Physical Therapy  Facility/Department: [facility] SURG  Physical Therapy Initial Assessment    Name: Timoteo Cuba  : 1933  MRN: [id number]  Date of Service: 8/15/2022    Discharge Recommendations:             Patient Diagnosis(es): There were no encounter diagnoses. Past Medical History:  has a past medical history of Anxiety, Dementia ([facility] DM II (diabetes mellitus, type II), controlled ([facility] [address] Essential (primary) hypertension, GERD (gastroesophageal reflux disease), HLD (hyperlipidemia), and Vertigo. Past Surgical History:  has a past surgical history that includes Breast surgery (Left) and ORIF femur fracture (Right). Requires PT Follow-Up: Yes       Evaluating Therapist: Candido Mccray PT     Referring Provider:        JUDITH Sierra CNP       PT order : PT eval and treat     Room #: 815  DIAGNOSIS:  GIB   Additional Pertinent History:multiple falls at [facility]   PRECAUTIONS:  falls     Social:  Pt lives at [facility]  in a 1 floor plan   Prior to admission pt walked with  ww. Pt reports she was independent with mobility, but does fall      Initial Evaluation  Date: 8/15/2022  Treatment      Short Term/ Long Term   Goals   Was pt agreeable to Eval/treatment? Yes      Does pt have pain?   LBP and LEs      Bed Mobility  Rolling:   Supine to sit: max assist   Sit to supine:  NT   Scooting:  min assist in sit    Min assist    Transfers Sit to stand:  mod assist with elevated bed   Stand to sit: min assist   Stand pivot:  NT   CGA    Ambulation     4 steps  with  ww  with  min assist    50  feet with  ww  with  SBA        Stair negotiation: ascended and descended NT   N/A    LE ROM  Grossly WFL      LE strength  3-/ 5 hips, 4-/ 5 distally   Increase by 1-2/ 3 MMT grade    AM- PAC RAW score               Pt is alert and Oriented x  3      Balance:  min assist . Fall risk due to  poor balance, weakness, decreased activity tolerance, and h/o falls   Endurance: decreased   Bed/Chair alarm: yes      ASSESSMENT  Pt displays functional ability as noted in the objective portion of this evaluation. Conditions Requiring Skilled Therapeutic Intervention:    [x]Decreased strength     []Decreased ROM  [x]Decreased functional mobility  [x]Decreased balance   [x]Decreased endurance   [x]Decreased posture  []Decreased sensation  []Decreased coordination   []Decreased vision  []Decreased safety awareness   [x]Increased pain         Treatment/Education:    Pt in bed  upon arrival ; agreeable to PT. Mobility as above. Pt needed assist with B LEs and trunk with supine to sit; cues given for sequencing . Pt reports increased back pain once sitting. Cues given for hand placement with transfers and posture once standing. Pt able to take a few steps bed to chair. Multiple adjustments to postioning made once pt in chair as she was uncomfortable. Instructed in LE seated exercises to perform as tolerated. Pt educated on fall risk,  safety with mobility        Patient response to education:   Pt verbalized understanding Pt demonstrated skill Pt requires further education in this area   x  With cues/assist  x       Comments:  Pt left  in recliner after session, with call light in reach. Waffle cushion and alarm  placed in chair       Rehab potential is Good for reaching above PT goals. Pts/ family goals   1. None stated     Patient and or family understand(s) diagnosis, prognosis, and plan of care. -  yes     PLAN  PT care will be provided in accordance with the objectives noted above. Whenever appropriate, clear delegation orders will be provided for nursing staff. Exercises and functional mobility practice will be used as well as appropriate assistive devices or modalities to obtain goals. Patient and family education will also be administered as needed.         PLAN OF CARE:    Current Treatment Recommendations     [x] Strengthening to improve independence with functional mobility   [] ROM to improve independence with functional mobility   [x] Balance Training to improve static/dynamic balance and to reduce fall risk  [x] Endurance Training to improve activity tolerance during functional mobility   [x] Transfer Training to improve safety and independence with all functional transfers   [x] Gait Training to improve gait mechanics, endurance and assess need for appropriate assistive device  [] Stair Training in preparation for safe discharge home and/or into the community   [x] Positioning to prevent skin breakdown and contractures  [x] Safety and Education Training   [x] Patient/Caregiver Education   [] HEP  [] Other     Frequency of treatments will be 2-5x/week x  7 -10 days. Time in: 0947   Time out:  1010       Evaluation Time includes thorough review of current medical information, gathering information on past medical history/social history and prior level of function, completion of standardized testing/informal observation of tasks, assessment of data and education on plan of care and goals.     CPT codes:  [x] Low Complexity PT evaluation 01499  [] Moderate Complexity PT evaluation 27238  [] High Complexity PT evaluation 70941  [] PT Re-evaluation 85325  [] Gait training 84315  minutes  [x] Therapeutic activities 58406 8 minutes  [] Therapeutic exercises 27573  minutes  [] Neuromuscular reeducation 32240  minutes       Jordin 18 number:  PT 5757

## 2022-08-15 NOTE — CARE COORDINATION
Transition of Care-Met with patient bedside, she lives at [facility] in [place] ([phone]), in an assisted living facility, plan to return. Left message for DON to call regarding transition back to AL. PT/OT ordered. Plan for EGD today and monitor Hgb. Anticipate discharge in the next 24-48 hrs.      Joanie LEIJAN, RN  [facility]

## 2022-08-15 NOTE — PROGRESS NOTES
Completed Orthostatic BP/Pulse per Dr order. Results are as follows.     Lying:  BP- 184/62  Pulse- 68    Sitting:  BP- 176/74  Pulse- 71    Standing:  BP- 177/70  Pulse- 91

## 2022-08-15 NOTE — ANESTHESIA PRE PROCEDURE
Department of Anesthesiology  Preprocedure Note       Name:  Cristela Vargas   Age:  80 y.o.  :  1933                                          MRN:  [id number]         Date:  8/15/2022      Surgeon: Marino Dunn):  Bk Esquivel MD    Procedure: Procedure(s):  EGD ESOPHAGOGASTRODUODENOSCOPY    Medications prior to admission:   Prior to Admission medications    Medication Sig Start Date End Date Taking? Authorizing Provider   atorvastatin (LIPITOR) 40 MG tablet Take 40 mg by mouth in the morning. At bedtime. Yes Historical Provider, MD   bumetanide (BUMEX) 0.5 MG tablet Take 0.5 mg by mouth in the morning. In the morning. Yes Historical Provider, MD   FLUoxetine (PROZAC) 20 MG capsule Take 40 mg by mouth in the morning. In the morning. Yes Historical Provider, MD   polyethylene glycol (GLYCOLAX) 17 GM/SCOOP powder Take 17 g by mouth daily   Yes Historical Provider, MD   losartan (COZAAR) 100 MG tablet Take 100 mg by mouth in the morning. In the morning. Yes Historical Provider, MD   Cholecalciferol (VITAMIN D3) 125 MCG (5000 UT) TABS Take by mouth once a week   Yes Historical Provider, MD   naproxen (NAPROSYN) 250 MG tablet Take 250 mg by mouth in the morning and 250 mg in the evening. Take with meals. Yes Historical Provider, MD   gabapentin (NEURONTIN) 100 MG capsule Take 100 mg by mouth in the morning and 100 mg at noon and 100 mg before bedtime.    Yes Historical Provider, MD   meclizine (ANTIVERT) 25 MG CHEW Take 25 mg by mouth as needed   Yes Historical Provider, MD       Current medications:    Current Facility-Administered Medications   Medication Dose Route Frequency Provider Last Rate Last Admin    0.9 % sodium chloride infusion   IntraVENous Continuous Stef Hric, DO        atorvastatin (LIPITOR) tablet 40 mg  40 mg Oral Daily Skip Gildardo, APRN - CNP   40 mg at 08/15/22 0848    FLUoxetine (PROZAC) capsule 40 mg  40 mg Oral Daily Skip Gildardo, APRN - CNP   40 mg at 08/15/22 0848    gabapentin (NEURONTIN) capsule 100 mg  100 mg Oral TID Raj Jewels, APRN - CNP   100 mg at 08/15/22 0849    sodium chloride flush 0.9 % injection 5-40 mL  5-40 mL IntraVENous 2 times per day Raj Jewels, APRN - CNP   10 mL at 08/15/22 0850    sodium chloride flush 0.9 % injection 5-40 mL  5-40 mL IntraVENous PRN Raj Jewels, APRN - CNP   10 mL at 08/14/22 1059    0.9 % sodium chloride infusion   IntraVENous PRN Raj Jewels, APRN - CNP        polyethylene glycol General Felt) packet 17 g  17 g Oral Daily PRN Raj Jewels, APRN - CNP        glucose chewable tablet 16 g  4 tablet Oral PRN Raj Jewels, APRN - CNP        dextrose bolus 10% 125 mL  125 mL IntraVENous PRN Raj Jewels, APRN - CNP        Or    dextrose bolus 10% 250 mL  250 mL IntraVENous PRN Raj Jewels, APRN - CNP        glucagon (rDNA) injection 1 mg  1 mg SubCUTAneous PRN Raj Jewels, APRN - CNP        dextrose 10 % infusion   IntraVENous Continuous PRN Raj Jewels, APRN - CNP        insulin lispro (HUMALOG) injection vial 0-4 Units  0-4 Units SubCUTAneous TID WC Raj Jewels, APRN - CNP   1 Units at 08/14/22 1613    insulin lispro (HUMALOG) injection vial 0-4 Units  0-4 Units SubCUTAneous Nightly Raj Jewels, APRN - CNP        acetaminophen (TYLENOL) tablet 650 mg  650 mg Oral Q4H PRN Raj Jewels, APRN - CNP   650 mg at 08/14/22 2031    pantoprazole (PROTONIX) 40 mg in sodium chloride (PF) 10 mL injection  40 mg IntraVENous BID Sally A Sugar, APRN - CNP   40 mg at 08/15/22 0849       Allergies:     Allergies   Allergen Reactions    Codeine     Demerol Hcl [Meperidine]     Hydrocodone-Acetaminophen     Percocet [Oxycodone-Acetaminophen]        Problem List:    Patient Active Problem List   Diagnosis Code    GI bleed K92.2    Melena K92.1    Anemia associated with acute blood loss D62    Uncontrolled type 2 diabetes mellitus with hyperglycemia [facility] E11.65    Hyperlipidemia E78.5       Past Medical History:        Diagnosis Date    Anxiety     Dementia ([facility] [address]     DM II (diabetes mellitus, type II), controlled ([facility] [address]     Essential (primary) hypertension     GERD (gastroesophageal reflux disease)     HLD (hyperlipidemia)     Vertigo        Past Surgical History:        Procedure Laterality Date    BREAST SURGERY Left     ORIF FEMUR FRACTURE Right        Social History:    Social History     Tobacco Use    Smoking status: Former     Types: Cigarettes    Smokeless tobacco: Never   Substance Use Topics    Alcohol use: Never                                Counseling given: Not Answered      Vital Signs (Current):   Vitals:    08/14/22 2029 08/15/22 0032 08/15/22 0720 08/15/22 1023   BP: (!) 121/48  (!) 139/59    Pulse: 62  64    Resp: 16  16    Temp: 98.2 °F (36.8 °C)  98.4 °F (36.9 °C)    TempSrc: Oral  Oral    SpO2: 96%      Weight:  198 lb 4.8 oz (89.9 kg)     Height:    5' 8\" (1.727 m)                                              BP Readings from Last 3 Encounters:   08/15/22 (!) 139/59       NPO Status: Time of last liquid consumption: 2000                        Time of last solid consumption: 1800                        Date of last liquid consumption: 08/14/22                        Date of last solid food consumption: 08/14/22    BMI:   Wt Readings from Last 3 Encounters:   08/15/22 198 lb 4.8 oz (89.9 kg)     Body mass index is 30.15 kg/m².     CBC:   Lab Results   Component Value Date/Time    WBC 8.6 08/15/2022 10:29 AM    RBC 2.59 08/15/2022 10:29 AM    HGB 7.5 08/15/2022 10:29 AM    HCT 23.2 08/15/2022 10:29 AM    MCV 89.6 08/15/2022 10:29 AM    RDW 16.0 08/15/2022 10:29 AM     08/15/2022 10:29 AM       CMP:   Lab Results   Component Value Date/Time     08/15/2022 03:11 AM    K 4.5 08/15/2022 03:11 AM     08/15/2022 03:11 AM    CO2 22 08/15/2022 03:11 AM    BUN 48 08/15/2022 03:11 AM    CREATININE 1.5 08/15/2022 03:11 AM    GFRAA 40 08/15/2022 03:11 AM    LABGLOM 33 08/15/2022 03:11 AM    GLUCOSE 141 08/15/2022 03:11 AM    CALCIUM 8.5 08/15/2022 03:11 AM       POC Tests: No results for input(s): POCGLU, POCNA, POCK, POCCL, POCBUN, POCHEMO, POCHCT in the last 72 hours. Coags:   Lab Results   Component Value Date/Time    PROTIME 11.5 08/15/2022 09:45 AM    INR 1.0 08/15/2022 09:45 AM       HCG (If Applicable): No results found for: PREGTESTUR, PREGSERUM, HCG, HCGQUANT     ABGs: No results found for: PHART, PO2ART, LZY4FWZ, FCY0PBG, BEART, N9QTYKFY     Type & Screen (If Applicable):  No results found for: LABABO, LABRH    Drug/Infectious Status (If Applicable):  No results found for: HIV, HEPCAB    COVID-19 Screening (If Applicable): No results found for: COVID19        Anesthesia Evaluation  Patient summary reviewed and Nursing notes reviewed no history of anesthetic complications:   Airway: Mallampati: III          Dental:    (+) edentulous      Pulmonary:normal exam  breath sounds clear to auscultation      (-) not a current smoker                           Cardiovascular:  Exercise tolerance: good (>4 METS),   (+) hypertension: no interval change and mild, hyperlipidemia        Rhythm: regular  Rate: normal           Beta Blocker:  Not on Beta Blocker         Neuro/Psych:   (+) neuromuscular disease (Neurontin):, psychiatric history:depression/anxiety dementia (Currently oriented to person, place, and time)             ROS comment: Vertigo GI/Hepatic/Renal:   (+) GERD:, renal disease (Creatinine 1.5 & GFR 33): CRI,          ROS comment: GI bleed with associated anemia s/p PRBC transfusion. Endo/Other:    (+) DiabetesType II DM, , blood dyscrasia (7.5/23. 2): anemia:., .          Pt had no PAT visit       Abdominal:         (-) obese       Vascular: negative vascular ROS. Other Findings:           Anesthesia Plan      MAC     ASA 3       Induction: intravenous. Anesthetic plan and risks discussed with patient.       Plan discussed with CRNA.                     Yamilka Ambrocio,    8/15/2022

## 2022-08-15 NOTE — PROGRESS NOTES
[address] [facility] Hospitalist   Progress Note    Admitting Date and Time: 8/14/2022  6:18 AM  Admit Dx: GI bleed [K92.2]    Subjective:    Pt feels well today. For EGD this afternoon. States she has no new concerns at this time. Per RN: For EGD today. ROS: denies fever, chills, cp, sob, n/v, HA unless stated above.      atorvastatin  40 mg Oral Daily    FLUoxetine  40 mg Oral Daily    gabapentin  100 mg Oral TID    losartan  100 mg Oral Daily    sodium chloride flush  5-40 mL IntraVENous 2 times per day    insulin lispro  0-4 Units SubCUTAneous TID WC    insulin lispro  0-4 Units SubCUTAneous Nightly    pantoprazole (PROTONIX) 40 mg injection  40 mg IntraVENous BID     sodium chloride flush, 5-40 mL, PRN  sodium chloride, , PRN  polyethylene glycol, 17 g, Daily PRN  glucose, 4 tablet, PRN  dextrose bolus, 125 mL, PRN   Or  dextrose bolus, 250 mL, PRN  glucagon (rDNA), 1 mg, PRN  dextrose, , Continuous PRN  acetaminophen, 650 mg, Q4H PRN         Objective:    BP (!) 139/59   Pulse 64   Temp 98.4 °F (36.9 °C) (Oral)   Resp 16   Wt 198 lb 4.8 oz (89.9 kg)   SpO2 96%   General Appearance: alert and oriented to person, place and time and in no acute distress  Skin: warm and dry  Head: normocephalic and atraumatic  Eyes: pupils equal, round, and reactive to light, extraocular eye movements intact, conjunctivae normal  Neck: neck supple and non tender without mass   Pulmonary/Chest: clear to auscultation bilaterally- no wheezes, rales or rhonchi, normal air movement, no respiratory distress  Cardiovascular: normal rate, normal S1 and S2 and no RGM  Abdomen: soft, non-tender, non-distended, normal bowel sounds  Extremities: no cyanosis, no clubbing and no edema  Neurologic: no cranial nerve deficit and speech normal      Recent Labs     08/14/22  1050 08/15/22  0311    133   K 4.3 4.5    105   CO2 23 22   BUN 49* 48*   CREATININE 1.1* 1.5*   GLUCOSE 229* 141*   CALCIUM 8.2* 8.5*       No results for input(s): ALKPHOS, PROT, LABALBU, BILITOT, AST, ALT in the last 72 hours. Recent Labs     08/14/22  1050   WBC 9.7   RBC 2.72*   HGB 7.7*   HCT 23.5*   MCV 86.4   MCH 28.3   MCHC 32.8   RDW 15.2*      MPV 8.9            Radiology:   No orders to display       Assessment:  Principal Problem:    GI bleed  Active Problems:    Melena    Anemia associated with acute blood loss    Uncontrolled type 2 diabetes mellitus with hyperglycemia ([address]    Hyperlipidemia  Resolved Problems:    * No resolved hospital problems. *      Plan:  GI Bleed- Noted  dark stools for unknown length of time. Upon presentation to [place] ED Does take naproxen 250mg BID. Hgb 5.5. Received 2U PRBC. Pantoprazole infusion DC. Transferred to [facility] for specialty evaluation. Trend H/H. Continue Pantoprazole. Hgb 7.5. EGD today. GI input appreciated. Recent Fall- likely 2/2 above. Pt states she has had numerous falls. Does have Hx vertigo, however pt states she does not recall being dizzy/lightheaded at time of fall. Check orthostatic bp. PT/OT. ASYA Lung Opacity- CXR 3.7cm Opacity in ASYA may be underlying mass. Labs pending. Consider Pulmonology input. HTN- Continue losartan. Holding Bumetanide. DM II- Check A1C. SSI/Hypoglycemic protocol/POCGT/CLD. Resume Carb control diet when ok with GI Follow adjust as needed. HLD- Atorvastatin. NOTE: This report was transcribed using voice recognition software. Every effort was made to ensure accuracy; however, inadvertent computerized transcription errors may be present. Electronically signed by Ruth Alcantar CNP on 8/15/2022 at 9:58 AM  HOSPITALIST ATTENDING PHYSICIAN NOTE 8/15/2022 1223PM:    Details of the evaluation - subjective assessment (including medication profile, past medical, family and social history when applicable), examination, review of lab and test data, diagnostic impressions and medical decision making - performed by Marek Villegas.  Allegra Chen - CNP, were discussed with me on the date of service and I agree with clinical information herein unless otherwise noted. The patient has been evaluated by me personally earlier today. Pt reports no fevers, chills,n/v.     Exam: heart reg at rate of 60,lungs cta, abd pos bs soft nt, ext neg for le edema    I agree with the assessment and plan of Jonatan Reilly. Bree Murrell - LOLY      Melena/Gi bleed  Anemia with acute blood component  Dm type 2 uncontrolled  Htn  hyperlipidemia      Electronically signed by Sarkis Medina D.O.   Hospitalist  [facility] Hospitalist Service at [facility]

## 2022-08-15 NOTE — BRIEF OP NOTE
Brief Postoperative Note    Geraldo Ingram  YOB: 1933  [id number]    Procedure: EGD with biopsy    Anesthesia: [facility]    Surgeon:  Nelson Syed MD    Findings:       Esophagus:  GERD      Stomach: ANTRAL ULCER.  Gastritis bx done to rule out H. Pylori      Duodenum:  DUODENITIS    Bx. done to rule out sprue       Complications: None      Estimated blood loss: none      Anitra Waters MD

## 2022-08-16 LAB
ANION GAP SERPL CALCULATED.3IONS-SCNC: 10 MMOL/L (ref 7–16)
BASOPHILS ABSOLUTE: 0.05 E9/L (ref 0–0.2)
BASOPHILS RELATIVE PERCENT: 0.7 % (ref 0–2)
BUN BLDV-MCNC: 35 MG/DL (ref 6–23)
CALCIUM SERPL-MCNC: 8.3 MG/DL (ref 8.6–10.2)
CHLORIDE BLD-SCNC: 106 MMOL/L (ref 98–107)
CO2: 20 MMOL/L (ref 22–29)
CREAT SERPL-MCNC: 1.2 MG/DL (ref 0.5–1)
EOSINOPHILS ABSOLUTE: 0.22 E9/L (ref 0.05–0.5)
EOSINOPHILS RELATIVE PERCENT: 3 % (ref 0–6)
GFR AFRICAN AMERICAN: 51
GFR NON-AFRICAN AMERICAN: 42 ML/MIN/1.73
GLUCOSE BLD-MCNC: 199 MG/DL (ref 74–99)
HCT VFR BLD CALC: 22.4 % (ref 34–48)
HEMOGLOBIN: 7.2 G/DL (ref 11.5–15.5)
IMMATURE GRANULOCYTES #: 0.03 E9/L
IMMATURE GRANULOCYTES %: 0.4 % (ref 0–5)
LYMPHOCYTES ABSOLUTE: 1.53 E9/L (ref 1.5–4)
LYMPHOCYTES RELATIVE PERCENT: 20.9 % (ref 20–42)
MCH RBC QN AUTO: 29.4 PG (ref 26–35)
MCHC RBC AUTO-ENTMCNC: 32.1 % (ref 32–34.5)
MCV RBC AUTO: 91.4 FL (ref 80–99.9)
METER GLUCOSE: 183 MG/DL (ref 74–99)
METER GLUCOSE: 203 MG/DL (ref 74–99)
METER GLUCOSE: 227 MG/DL (ref 74–99)
METER GLUCOSE: 272 MG/DL (ref 74–99)
MONOCYTES ABSOLUTE: 0.74 E9/L (ref 0.1–0.95)
MONOCYTES RELATIVE PERCENT: 10.1 % (ref 2–12)
NEUTROPHILS ABSOLUTE: 4.75 E9/L (ref 1.8–7.3)
NEUTROPHILS RELATIVE PERCENT: 64.9 % (ref 43–80)
PDW BLD-RTO: 15.4 FL (ref 11.5–15)
PLATELET # BLD: 214 E9/L (ref 130–450)
PMV BLD AUTO: 9 FL (ref 7–12)
POTASSIUM REFLEX MAGNESIUM: 4 MMOL/L (ref 3.5–5)
RBC # BLD: 2.45 E12/L (ref 3.5–5.5)
SODIUM BLD-SCNC: 136 MMOL/L (ref 132–146)
WBC # BLD: 7.3 E9/L (ref 4.5–11.5)

## 2022-08-16 PROCEDURE — 2580000003 HC RX 258: Performed by: ANESTHESIOLOGY

## 2022-08-16 PROCEDURE — 6370000000 HC RX 637 (ALT 250 FOR IP): Performed by: INTERNAL MEDICINE

## 2022-08-16 PROCEDURE — C9113 INJ PANTOPRAZOLE SODIUM, VIA: HCPCS | Performed by: INTERNAL MEDICINE

## 2022-08-16 PROCEDURE — 6360000002 HC RX W HCPCS: Performed by: INTERNAL MEDICINE

## 2022-08-16 PROCEDURE — 97165 OT EVAL LOW COMPLEX 30 MIN: CPT

## 2022-08-16 PROCEDURE — 82962 GLUCOSE BLOOD TEST: CPT

## 2022-08-16 PROCEDURE — 36415 COLL VENOUS BLD VENIPUNCTURE: CPT

## 2022-08-16 PROCEDURE — 99232 SBSQ HOSP IP/OBS MODERATE 35: CPT | Performed by: INTERNAL MEDICINE

## 2022-08-16 PROCEDURE — 80048 BASIC METABOLIC PNL TOTAL CA: CPT

## 2022-08-16 PROCEDURE — A4216 STERILE WATER/SALINE, 10 ML: HCPCS | Performed by: INTERNAL MEDICINE

## 2022-08-16 PROCEDURE — 85025 COMPLETE CBC W/AUTO DIFF WBC: CPT

## 2022-08-16 PROCEDURE — 2580000003 HC RX 258: Performed by: INTERNAL MEDICINE

## 2022-08-16 PROCEDURE — 2060000000 HC ICU INTERMEDIATE R&B

## 2022-08-16 RX ORDER — TRAMADOL HYDROCHLORIDE 50 MG/1
25 TABLET ORAL ONCE
Status: COMPLETED | OUTPATIENT
Start: 2022-08-16 | End: 2022-08-16

## 2022-08-16 RX ADMIN — INSULIN LISPRO 2 UNITS: 100 INJECTION, SOLUTION INTRAVENOUS; SUBCUTANEOUS at 10:42

## 2022-08-16 RX ADMIN — FLUOXETINE HYDROCHLORIDE 40 MG: 20 CAPSULE ORAL at 08:37

## 2022-08-16 RX ADMIN — INSULIN LISPRO 1 UNITS: 100 INJECTION, SOLUTION INTRAVENOUS; SUBCUTANEOUS at 16:55

## 2022-08-16 RX ADMIN — SODIUM CHLORIDE 40 MG: 9 INJECTION INTRAMUSCULAR; INTRAVENOUS; SUBCUTANEOUS at 21:20

## 2022-08-16 RX ADMIN — INSULIN LISPRO 1 UNITS: 100 INJECTION, SOLUTION INTRAVENOUS; SUBCUTANEOUS at 06:56

## 2022-08-16 RX ADMIN — SODIUM CHLORIDE 40 MG: 9 INJECTION INTRAMUSCULAR; INTRAVENOUS; SUBCUTANEOUS at 08:37

## 2022-08-16 RX ADMIN — Medication 10 ML: at 21:20

## 2022-08-16 RX ADMIN — GABAPENTIN 100 MG: 100 CAPSULE ORAL at 21:20

## 2022-08-16 RX ADMIN — GABAPENTIN 100 MG: 100 CAPSULE ORAL at 14:42

## 2022-08-16 RX ADMIN — GABAPENTIN 100 MG: 100 CAPSULE ORAL at 08:37

## 2022-08-16 RX ADMIN — Medication 10 ML: at 09:15

## 2022-08-16 RX ADMIN — TRAMADOL HYDROCHLORIDE 25 MG: 50 TABLET, FILM COATED ORAL at 08:17

## 2022-08-16 RX ADMIN — DESMOPRESSIN ACETATE 40 MG: 0.2 TABLET ORAL at 08:37

## 2022-08-16 ASSESSMENT — PAIN SCALES - GENERAL
PAINLEVEL_OUTOF10: 0
PAINLEVEL_OUTOF10: 0
PAINLEVEL_OUTOF10: 10
PAINLEVEL_OUTOF10: 3

## 2022-08-16 ASSESSMENT — PAIN DESCRIPTION - DESCRIPTORS
DESCRIPTORS: ACHING
DESCRIPTORS: ACHING

## 2022-08-16 ASSESSMENT — PAIN DESCRIPTION - LOCATION
LOCATION: BACK
LOCATION: BACK

## 2022-08-16 NOTE — PROGRESS NOTES
P Quality Flow/Interdisciplinary Rounds Progress Note        Quality Flow Rounds held on August 16, 2022    Disciplines Attending:  Bedside Nurse, , , and Nursing Unit Leadership    [place] was admitted on 8/14/2022  6:18 AM    Anticipated Discharge Date:       Disposition:    Zak Score:  Zak Scale Score: 18    Readmission Risk              Risk of Unplanned Readmission:  8           Discussed patient goal for the day, patient clinical progression, and barriers to discharge. The following Goal(s) of the Day/Commitment(s) have been identified:  Monitor H/H, PT/OT and Check GI Plan.       Virginia Szymanski RN  August 16, 2022

## 2022-08-16 NOTE — OP NOTE
[address] [address]                                OPERATIVE REPORT    PATIENT NAME: Kaela Garcia                :        1933  MED REC NO:   [id number]                            ROOM:       0617  ACCOUNT NO:   [de-identified]                           ADMIT DATE: 2022  PROVIDER:     Ingrid Monreal MD    DATE OF PROCEDURE:  08/15/2022    PROCEDURE PERFORMED:  Upper endoscopy with biopsy. PREOPERATIVE DIAGNOSIS:  Evaluation for anemia and abdominal pain. POSTOPERATIVE DIAGNOSES:  Grade B LA classification GERD. Stomach  showed moderate antral ulcer and severe antritis. Biopsies were done to  rule out H. pylori infection. Duodenum showed some duodenal atrophy at  the bulb. Biopsies were done to rule out sprue. ANESTHESIA:  LMAC. NOTE:  Prior to the procedure an informed consent was obtained from the  patient after explaining the benefits as well as the risks,  alternatives, and complications of the procedure to the patient, who  understood and agreed. PROCEDURE:  With the patient in the left lateral decubitus position, the  Olympus GIF-100 forward-viewing videoscope was introduced into the  esophagus, the evaluation of which showed grade B LA classification GERD  and no hiatal hernia was seen. The scope was then advanced through the gastroesophageal junction into  the gastric body, along the greater curvature. Evaluation of the body  of the stomach showed gastritis, severe at the antrum. There was also a  moderately sized antral ulcer. Biopsies were done to rule out H. pylori  infection. The scope was then advanced through the pylorus into the duodenal bulb  and second portion of the duodenum. Duodenum biopsied to rule out  sprue.   The scope was then retrieved and retroflexed in the prepyloric  antrum, with thorough evaluation of the cardiac and fundal portions of  the stomach, which appeared to be within normal limits. The scope was then straightened, the area deflated, and the procedure  was terminated by withdrawing the scope and conducting a second look on  the way out, which was essentially the same. The patient tolerated the procedure well.         Radha Joseph MD    D: 08/15/2022 15:15:13       T: 08/15/2022 16:20:04     [id number]  Job#: [id number]     Doc#: [id number]    CC:  Dr. Damian Payne MD

## 2022-08-16 NOTE — PROGRESS NOTES
Occupational Therapy    OCCUPATIONAL THERAPY INITIAL EVALUATION     Har[facility] Eisenberg [place], [place]         MYVV:                                                  Patient Name: Miesha Lucio    MRN: [id number]    : 1933    Room: [address] [place] [address]      Evaluating OT: Catalina Myers OTR/L   [id number]      Referring Jalen Peterson MD    Specific Provider Orders/Date:OT eval and treat 2022      Diagnosis:  GI bleed [K92.2]     Pertinent Medical History: anxiety, dementia, vertigo,      Precautions:  Fall Risk, alarm      Assessment of current deficits    [x] Functional mobility  [x]ADLs  [x] Strength               [x]Cognition    [x] Functional transfers   [x] IADLs         [x] Safety Awareness   [x]Endurance    [] Fine Coordination              [x] Balance      [] Vision/perception   []Sensation     []Gross Motor Coordination  [] ROM  [] Delirium                   [] Motor Control     OT PLAN OF CARE   OT POC based on physician orders, patient diagnosis and results of clinical assessment    Frequency/Duration  2-4 days/wk for 2 weeks PRN   Specific OT Treatment Interventions to include:   ADL retraining/adapted techniques and AE recommendations to increase functional independence within precautions                    Energy conservation techniques to improve tolerance for selfcare routine   Functional transfer/mobility training/DME recommendations for increased independence, safety and fall prevention         Patient/family education to increase safety and functional independence             Environmental modifications for safe mobility and completion of ADLs                             Therapeutic activity to improve functional performance during ADLs.                                          Therapeutic exercise to improve tolerance and functional strength for ADLs    Balance retraining/tolerance tasks for facilitation of and independence with completion of ADL/IADL tasks for functional independence and quality of life. Rehab Potential: good for established goals     Patient / Family Goal: none stated       Patient and/or family were instructed on functional diagnosis, prognosis/goals and OT plan of care. Demonstrated fair  understanding. Eval Complexity: Low    Time In: 1255  Time Out: 1310      Min Units   OT Eval Low 97165 x  1   OT Eval Medium 99504      OT Eval High 09780      OT Re-Eval [id number]       Therapeutic Ex 06197      Therapeutic Activities 07483       ADL/Self Care 48869       Orthotic Management 87530       Manual 05231     Neuro Re-Ed 38750       Non-Billable Time          Evaluation Time additionally includes thorough review of current medical information, gathering information on past medical history/social history and prior level of function, interpretation of standardized testing/informal observation of tasks, assessment of data and development of plan of care and goals.             Oniel Wade  OTR/L  OT [id number]

## 2022-08-16 NOTE — PROGRESS NOTES
PROGRESS NOTE        Patient Presents with/Seen in Consultation For      *Reason for Consult: GIB  CHIEF COMPLAINT:  recent falls  Subjective:     Patient seen [facility] in bed in no apparent distress. Denies abdominal pain. Tolerating diet. Discussed with nursing no BM since admission. Reviewed EGD with patient all additional questions and concerns addressed. Review of Systems  Aside from what was mentioned in the PMH and HPI, essentially unremarkable, all others negative. Objective:     BP (!) 173/63   Pulse 77   Temp 98.6 °F (37 °C) (Oral)   Resp 16   Ht 5' 8\" (1.727 m)   Wt 199 lb 14.4 oz (90.7 kg)   SpO2 92%   BMI 30.39 kg/m²     General appearance: alert, awake, pale laying in bed, and cooperative  Eyes: conjunctiva pale, sclera anicteric. PERRL.   Lungs: clear to auscultation bilaterally  Heart: regular rate and rhythm, no murmur, 2+ pulses; no edema  Abdomen: soft, non-tender; bowel sounds normal; no masses,  no organomegaly  Extremities: extremities without edema  Pulses: 2+ and symmetric  Skin: Skin color pale, texture, turgor normal.   Neurologic: Grossly normal    0.9 % sodium chloride infusion, Continuous  sodium chloride flush 0.9 % injection 5-40 mL, 2 times per day  sodium chloride flush 0.9 % injection 5-40 mL, PRN  0.9 % sodium chloride infusion, PRN  fentaNYL (SUBLIMAZE) injection 25 mcg, Q5 Min PRN  labetalol (NORMODYNE;TRANDATE) injection 5 mg, Q15 Min PRN   Or  hydrALAZINE (APRESOLINE) injection 5 mg, Q15 Min PRN  atorvastatin (LIPITOR) tablet 40 mg, Daily  FLUoxetine (PROZAC) capsule 40 mg, Daily  gabapentin (NEURONTIN) capsule 100 mg, TID  polyethylene glycol (GLYCOLAX) packet 17 g, Daily PRN  glucose chewable tablet 16 g, PRN  dextrose bolus 10% 125 mL, PRN   Or  dextrose bolus 10% 250 mL, PRN  glucagon (rDNA) injection 1 mg, PRN  dextrose 10 % infusion, Continuous PRN  insulin lispro (HUMALOG) injection vial 0-4 Units, TID WC  insulin lispro (HUMALOG) injection vial 0-4 Units, Nightly  acetaminophen (TYLENOL) tablet 650 mg, Q4H PRN  pantoprazole (PROTONIX) 40 mg in sodium chloride (PF) 10 mL injection, BID       Data Review  CBC:   Lab Results   Component Value Date/Time    WBC 7.3 08/16/2022 04:40 AM    RBC 2.45 08/16/2022 04:40 AM    HGB 7.2 08/16/2022 04:40 AM    HCT 22.4 08/16/2022 04:40 AM    MCV 91.4 08/16/2022 04:40 AM    MCH 29.4 08/16/2022 04:40 AM    MCHC 32.1 08/16/2022 04:40 AM    RDW 15.4 08/16/2022 04:40 AM     08/16/2022 04:40 AM    MPV 9.0 08/16/2022 04:40 AM     CMP:    Lab Results   Component Value Date/Time     08/16/2022 04:40 AM    K 4.0 08/16/2022 04:40 AM     08/16/2022 04:40 AM    CO2 20 08/16/2022 04:40 AM    BUN 35 08/16/2022 04:40 AM    CREATININE 1.2 08/16/2022 04:40 AM    GFRAA 51 08/16/2022 04:40 AM    LABGLOM 42 08/16/2022 04:40 AM    GLUCOSE 199 08/16/2022 04:40 AM    CALCIUM 8.3 08/16/2022 04:40 AM       PT/INR:    Lab Results   Component Value Date/Time    PROTIME 11.5 08/15/2022 09:45 AM    INR 1.0 08/15/2022 09:45 AM       Assessment:     Active Problems:  Melanotic stools- +occult at [facility]  Anemia  Epigastric pain  Excess NSAID use  PMHx of colon polyps  ASYA mass- per CT from [facility]  Recent falls  EGD 8/15/22- Grade B LA classification GERD. Stomach showed moderate antral ulcer and severe antritis. Biopsies were done to rule out H. pylori infection. Duodenum showed some duodenal atrophy at the bulb. Biopsies were done to rule out sprue. Plan:   Protonix 40 MG IV BID  GERD diet   Serial H&H, transfuse >7 per Primary Care  Supportive care  Monitor CBC daily  Monitor stools   Continue to monitor       Note: This report was completed utilizing computer voice recognition software. Every effort has been made to ensure accuracy, however; inadvertent computerized transcription errors may be present.      Discussed with Dr. Cristobal Marsh per Dr. Chau Older APRN-NP-C 8/16/2022  10:27 AM For Dr. Shantel Johnson

## 2022-08-16 NOTE — ANESTHESIA POSTPROCEDURE EVALUATION
Department of Anesthesiology  Postprocedure Note    Patient: David Leon  MRN: [id number]  [place]: 8/22/1933  Date of evaluation: 8/16/2022      Procedure Summary     Date: 08/15/22 Room / Location: [facility] / [facility]    Anesthesia Start: 9947 Anesthesia Stop: 1459    Procedure: EGD BIOPSY Diagnosis:       Gastrointestinal hemorrhage, unspecified gastrointestinal hemorrhage type      (Gastrointestinal hemorrhage, unspecified gastrointestinal hemorrhage type [K92.2])    Surgeons: Melanie Machuca MD Responsible Provider: Kelvin Sanford DO    Anesthesia Type: MAC ASA Status: 3          Anesthesia Type: MAC    Carlos Phase I:      Carlos Phase II: Carlos Score: 10      Anesthesia Post Evaluation    Patient location during evaluation: bedside  Patient participation: complete - patient participated  Level of consciousness: awake  Pain score: 1  Airway patency: patent  Nausea & Vomiting: no vomiting and no nausea  Complications: no  Cardiovascular status: hemodynamically stable  Respiratory status: acceptable  Hydration status: stable

## 2022-08-16 NOTE — PROGRESS NOTES
Patient's son called and left first choice is [facility]. Phone # is [phone]. SW to make referral during business hours.     Electronically signed by Aleida Webb RN on 8/16/2022 at 4:23 PM

## 2022-08-16 NOTE — CARE COORDINATION
Social work / Discharge planning:       Social work contacted [facility] and spoke to Christos Cortes. Based on patient's therapy score, facility is recommending [facility]. Social work spoke to patient's daughter in law via phone and explained situation. She is calling patient's son at work to find out what SNF they prefer. Awaiting return call.    Electronically signed by DAVID Da Silva on 8/16/2022 at 9:44 AM

## 2022-08-16 NOTE — PROGRESS NOTES
[address] [place] [address] Hospitalist   Progress Note    Admitting Date and Time: 8/14/2022  6:18 AM  Admit Dx: GI bleed [K92.2]    Subjective:    Pt lying in bed eyes closed. Awakens to name. States she slept well through the night. She denies any new concerns at this time. Per RN: Will likely require placement to skilled side of facility.      ROS: denies fever, chills, cp, sob, n/v, HA unless stated above.     sodium chloride flush  5-40 mL IntraVENous 2 times per day    atorvastatin  40 mg Oral Daily    FLUoxetine  40 mg Oral Daily    gabapentin  100 mg Oral TID    insulin lispro  0-4 Units SubCUTAneous TID WC    insulin lispro  0-4 Units SubCUTAneous Nightly    pantoprazole (PROTONIX) 40 mg injection  40 mg IntraVENous BID     sodium chloride flush, 5-40 mL, PRN  sodium chloride, , PRN  fentanNYL, 25 mcg, Q5 Min PRN  labetalol, 5 mg, Q15 Min PRN   Or  hydrALAZINE, 5 mg, Q15 Min PRN  polyethylene glycol, 17 g, Daily PRN  glucose, 4 tablet, PRN  dextrose bolus, 125 mL, PRN   Or  dextrose bolus, 250 mL, PRN  glucagon (rDNA), 1 mg, PRN  dextrose, , Continuous PRN  acetaminophen, 650 mg, Q4H PRN       Objective:    BP (!) 173/63   Pulse 77   Temp 98.6 °F (37 °C) (Oral)   Resp 16   Ht 5' 8\" (1.727 m)   Wt 199 lb 14.4 oz (90.7 kg)   SpO2 92%   BMI 30.39 kg/m²   General Appearance: alert and oriented to person, place and time and in no acute distress  Skin: warm and dry  Head: normocephalic and atraumatic  Eyes: pupils equal, round, and reactive to light, extraocular eye movements intact, conjunctivae normal  Neck: neck supple and non tender without mass   Pulmonary/Chest: clear to auscultation bilaterally- no wheezes, rales or rhonchi, normal air movement, no respiratory distress  Cardiovascular: normal rate, normal S1 and S2 and no RGM  Abdomen: soft, non-tender, non-distended, normal bowel sounds  Extremities: no cyanosis, no clubbing and no edema  Neurologic: no cranial nerve deficit and speech normal      Recent Labs     08/14/22  1050 08/15/22  0311 08/16/22  0440    133 136   K 4.3 4.5 4.0    105 106   CO2 23 22 20*   BUN 49* 48* 35*   CREATININE 1.1* 1.5* 1.2*   GLUCOSE 229* 141* 199*   CALCIUM 8.2* 8.5* 8.3*         No results for input(s): ALKPHOS, PROT, LABALBU, BILITOT, AST, ALT in the last 72 hours. Recent Labs     08/14/22  1050 08/15/22  1029 08/16/22  0440   WBC 9.7 8.6 7.3   RBC 2.72* 2.59* 2.45*   HGB 7.7* 7.5* 7.2*   HCT 23.5* 23.2* 22.4*   MCV 86.4 89.6 91.4   MCH 28.3 29.0 29.4   MCHC 32.8 32.3 32.1   RDW 15.2* 16.0* 15.4*    230 214   MPV 8.9 8.9 9.0              Radiology:   No orders to display       Assessment:  Principal Problem:    GI bleed  Active Problems:    Melena    Anemia associated with acute blood loss    Uncontrolled type 2 diabetes mellitus with hyperglycemia ([facility] [address]    Hyperlipidemia  Resolved Problems:    * No resolved hospital problems. *      Plan:  GI Bleed- Noted  dark stools for unknown length of time. Upon presentation to [facility] ED Does take naproxen 250mg BID. Hgb 5.5. Received 2U PRBC. Pantoprazole infusion DC. Transferred to [facility] for specialty evaluation. Trend H/H. Continue Pantoprazole. Hgb 7.2. 8/15/2022 S/P EGD Revealing Moderately sized Antral Ucer. Gastritis sever at the antrum. Duodenitis. Bx to r/o sprue/H. Pylori. GI input appreciated. Recent Fall- likely 2/2 above. Pt states she has had numerous falls. Does have Hx vertigo, however pt states she does not recall being dizzy/lightheaded at time of fall. Check orthostatic bp. PT/OT. ASYA Lung Opacity- CXR 3.7cm Opacity in ASYA may be underlying mass. Labs pending. Consider Pulmonology input. HTN- Continue losartan. Holding Bumetanide. DM II- Check A1C. SSI/Hypoglycemic protocol/POCGT/CLD. Resume Carb control diet when ok with GI Follow adjust as needed. HLD- Atorvastatin. Disposition- Awaiting referral to [facility] 12/24. Case Management following.      NOTE: This report was transcribed using voice recognition software. Every effort was made to ensure accuracy; however, inadvertent computerized transcription errors may be present. Electronically signed by Toñito Alcantar CNP on 8/16/2022 at 9:51 AM  HOSPITALIST ATTENDING PHYSICIAN NOTE 8/16/2022 1816PM:    Details of the evaluation - subjective assessment (including medication profile, past medical, family and social history when applicable), examination, review of lab and test data, diagnostic impressions and medical decision making - performed by Paco Howard. Ana Laura Alcantar CNP, were discussed with me on the date of service and I agree with clinical information herein unless otherwise noted. The patient has been evaluated by me personally earlier today. Pt reports no fevers, chills,n/v.     Exam: heart reg at rate of 76,lungs cta, abd pos bs soft nt, ext neg for le edema    I agree with the assessment and plan of Paco Alcantar CNP    Melena/Gi bleed  Laverda Bump   Antritis with antral ulcer  duodenitis  Anemia with acute blood component  Dm type 2 uncontrolled  Htn  hyperlipidemia    Electronically signed by Steve Kim D.O.   Hospitalist  [facility] Hospitalist Service at [facility]

## 2022-08-17 ENCOUNTER — APPOINTMENT (OUTPATIENT)
Dept: GENERAL RADIOLOGY | Age: 87
DRG: 378 | End: 2022-08-17
Attending: INTERNAL MEDICINE
Payer: MEDICARE

## 2022-08-17 LAB
ANION GAP SERPL CALCULATED.3IONS-SCNC: 7 MMOL/L (ref 7–16)
BASOPHILS ABSOLUTE: 0.06 E9/L (ref 0–0.2)
BASOPHILS RELATIVE PERCENT: 0.8 % (ref 0–2)
BUN BLDV-MCNC: 27 MG/DL (ref 6–23)
CALCIUM SERPL-MCNC: 8.7 MG/DL (ref 8.6–10.2)
CHLORIDE BLD-SCNC: 104 MMOL/L (ref 98–107)
CLOTEST: NORMAL
CO2: 23 MMOL/L (ref 22–29)
CREAT SERPL-MCNC: 1.1 MG/DL (ref 0.5–1)
EOSINOPHILS ABSOLUTE: 0.2 E9/L (ref 0.05–0.5)
EOSINOPHILS RELATIVE PERCENT: 2.8 % (ref 0–6)
GFR AFRICAN AMERICAN: 57
GFR NON-AFRICAN AMERICAN: 47 ML/MIN/1.73
GLUCOSE BLD-MCNC: 212 MG/DL (ref 74–99)
HCT VFR BLD CALC: 23.8 % (ref 34–48)
HEMOGLOBIN: 7.4 G/DL (ref 11.5–15.5)
IMMATURE GRANULOCYTES #: 0.02 E9/L
IMMATURE GRANULOCYTES %: 0.3 % (ref 0–5)
LYMPHOCYTES ABSOLUTE: 1.32 E9/L (ref 1.5–4)
LYMPHOCYTES RELATIVE PERCENT: 18.7 % (ref 20–42)
MCH RBC QN AUTO: 28.7 PG (ref 26–35)
MCHC RBC AUTO-ENTMCNC: 31.1 % (ref 32–34.5)
MCV RBC AUTO: 92.2 FL (ref 80–99.9)
METER GLUCOSE: 181 MG/DL (ref 74–99)
METER GLUCOSE: 227 MG/DL (ref 74–99)
METER GLUCOSE: 237 MG/DL (ref 74–99)
METER GLUCOSE: 269 MG/DL (ref 74–99)
MONOCYTES ABSOLUTE: 0.85 E9/L (ref 0.1–0.95)
MONOCYTES RELATIVE PERCENT: 12 % (ref 2–12)
NEUTROPHILS ABSOLUTE: 4.62 E9/L (ref 1.8–7.3)
NEUTROPHILS RELATIVE PERCENT: 65.4 % (ref 43–80)
PDW BLD-RTO: 15.5 FL (ref 11.5–15)
PLATELET # BLD: 234 E9/L (ref 130–450)
PMV BLD AUTO: 9.2 FL (ref 7–12)
POTASSIUM REFLEX MAGNESIUM: 4.1 MMOL/L (ref 3.5–5)
RBC # BLD: 2.58 E12/L (ref 3.5–5.5)
SODIUM BLD-SCNC: 134 MMOL/L (ref 132–146)
WBC # BLD: 7.1 E9/L (ref 4.5–11.5)

## 2022-08-17 PROCEDURE — 6370000000 HC RX 637 (ALT 250 FOR IP): Performed by: NURSE PRACTITIONER

## 2022-08-17 PROCEDURE — 97530 THERAPEUTIC ACTIVITIES: CPT

## 2022-08-17 PROCEDURE — 2060000000 HC ICU INTERMEDIATE R&B

## 2022-08-17 PROCEDURE — 80048 BASIC METABOLIC PNL TOTAL CA: CPT

## 2022-08-17 PROCEDURE — 82962 GLUCOSE BLOOD TEST: CPT

## 2022-08-17 PROCEDURE — 72100 X-RAY EXAM L-S SPINE 2/3 VWS: CPT

## 2022-08-17 PROCEDURE — 72072 X-RAY EXAM THORAC SPINE 3VWS: CPT

## 2022-08-17 PROCEDURE — 85025 COMPLETE CBC W/AUTO DIFF WBC: CPT

## 2022-08-17 PROCEDURE — 6370000000 HC RX 637 (ALT 250 FOR IP): Performed by: INTERNAL MEDICINE

## 2022-08-17 PROCEDURE — 2580000003 HC RX 258: Performed by: ANESTHESIOLOGY

## 2022-08-17 PROCEDURE — 99232 SBSQ HOSP IP/OBS MODERATE 35: CPT | Performed by: INTERNAL MEDICINE

## 2022-08-17 PROCEDURE — 36415 COLL VENOUS BLD VENIPUNCTURE: CPT

## 2022-08-17 PROCEDURE — 73521 X-RAY EXAM HIPS BI 2 VIEWS: CPT

## 2022-08-17 RX ORDER — TRAMADOL HYDROCHLORIDE 50 MG/1
25 TABLET ORAL EVERY 4 HOURS PRN
Status: DISCONTINUED | OUTPATIENT
Start: 2022-08-17 | End: 2022-08-19 | Stop reason: HOSPADM

## 2022-08-17 RX ORDER — PANTOPRAZOLE SODIUM 40 MG/1
40 TABLET, DELAYED RELEASE ORAL
Status: DISCONTINUED | OUTPATIENT
Start: 2022-08-17 | End: 2022-08-19 | Stop reason: HOSPADM

## 2022-08-17 RX ADMIN — INSULIN LISPRO 1 UNITS: 100 INJECTION, SOLUTION INTRAVENOUS; SUBCUTANEOUS at 16:06

## 2022-08-17 RX ADMIN — FLUOXETINE HYDROCHLORIDE 40 MG: 20 CAPSULE ORAL at 09:03

## 2022-08-17 RX ADMIN — Medication 10 ML: at 09:06

## 2022-08-17 RX ADMIN — TRAMADOL HYDROCHLORIDE 25 MG: 50 TABLET, COATED ORAL at 21:51

## 2022-08-17 RX ADMIN — PANTOPRAZOLE SODIUM 40 MG: 40 TABLET, DELAYED RELEASE ORAL at 16:06

## 2022-08-17 RX ADMIN — DESMOPRESSIN ACETATE 40 MG: 0.2 TABLET ORAL at 09:03

## 2022-08-17 RX ADMIN — GABAPENTIN 100 MG: 100 CAPSULE ORAL at 09:03

## 2022-08-17 RX ADMIN — INSULIN LISPRO 2 UNITS: 100 INJECTION, SOLUTION INTRAVENOUS; SUBCUTANEOUS at 11:21

## 2022-08-17 RX ADMIN — PANTOPRAZOLE SODIUM 40 MG: 40 TABLET, DELAYED RELEASE ORAL at 09:10

## 2022-08-17 RX ADMIN — ACETAMINOPHEN 650 MG: 325 TABLET ORAL at 08:15

## 2022-08-17 RX ADMIN — Medication 10 ML: at 21:51

## 2022-08-17 RX ADMIN — GABAPENTIN 100 MG: 100 CAPSULE ORAL at 14:14

## 2022-08-17 RX ADMIN — GABAPENTIN 100 MG: 100 CAPSULE ORAL at 21:51

## 2022-08-17 ASSESSMENT — PAIN SCALES - GENERAL
PAINLEVEL_OUTOF10: 7
PAINLEVEL_OUTOF10: 7
PAINLEVEL_OUTOF10: 2
PAINLEVEL_OUTOF10: 8
PAINLEVEL_OUTOF10: 0

## 2022-08-17 ASSESSMENT — PAIN DESCRIPTION - LOCATION
LOCATION: BACK

## 2022-08-17 ASSESSMENT — PAIN DESCRIPTION - DESCRIPTORS
DESCRIPTORS: ACHING

## 2022-08-17 ASSESSMENT — PAIN - FUNCTIONAL ASSESSMENT
PAIN_FUNCTIONAL_ASSESSMENT: PREVENTS OR INTERFERES WITH MANY ACTIVE NOT PASSIVE ACTIVITIES
PAIN_FUNCTIONAL_ASSESSMENT: PREVENTS OR INTERFERES WITH MANY ACTIVE NOT PASSIVE ACTIVITIES

## 2022-08-17 ASSESSMENT — PAIN DESCRIPTION - ONSET: ONSET: ON-GOING

## 2022-08-17 ASSESSMENT — PAIN DESCRIPTION - ORIENTATION
ORIENTATION: LOWER
ORIENTATION: LOWER

## 2022-08-17 ASSESSMENT — PAIN DESCRIPTION - FREQUENCY: FREQUENCY: CONTINUOUS

## 2022-08-17 ASSESSMENT — PAIN DESCRIPTION - PAIN TYPE: TYPE: CHRONIC PAIN

## 2022-08-17 NOTE — DISCHARGE INSTRUCTIONS
CALL DR. Timothy Brice OFFICE FOR FOLLOW UP APPOINTMENT.   [phone]  [address] [place] [address],[address], [address] [place] [address] Drive     Medical Problems to address as out patient:    ASYA opacity noted on outpatient: needs to be addressed as outpatient  T2/T4 Fracture: pain control. Outpatient referral to neurosurgery  Complete abx for ? H Pylori  Iron tablet for anemia treatment. Monitor cbc weekly.

## 2022-08-17 NOTE — PROGRESS NOTES
[address] TREATMENT NOTE      Date:2022  Patient Name: Jennifer Xie  MRN: [id number]  : 1933  Room: [address] [place], [place] [address]      Evaluating OT: Lisa Viveros OTGRACIELA/FLACA   [id number]       Referring Juan Blanco MD    Specific Provider Orders/Date:OT eval and treat 2022       Diagnosis:  GI bleed [K92.2]     Pertinent Medical History: anxiety, dementia, vertigo,      Precautions:  Fall Risk, alarm       Assessment of current deficits   [x] Functional mobility            [x]ADLs           [x] Strength                  [x]Cognition   [x] Functional transfers          [x] IADLs         [x] Safety Awareness   [x]Endurance   [] Fine Coordination                         [x] Balance      [] Vision/perception   []Sensation      []Gross Motor Coordination             [] ROM           [] Delirium                   [] Motor Control     OT PLAN OF CARE   OT POC based on physician orders, patient diagnosis and results of clinical assessment     Frequency/Duration  2-4 days/wk for 2 weeks PRN  Specific OT Treatment Interventions to include:   ADL retraining/adapted techniques and AE recommendations to increase functional independence within precautions                    Energy conservation techniques to improve tolerance for selfcare routine  Functional transfer/mobility training/DME recommendations for increased independence, safety and fall prevention         Patient/family education to increase safety and functional independence             Environmental modifications for safe mobility and completion of ADLs                             Therapeutic activity to improve functional performance during ADLs. Therapeutic exercise to improve tolerance and functional strength for ADLs   Balance retraining/tolerance tasks for facilitation of postural control with dynamic challenges during ADLs .       Positioning to improve functional independence Recommended Adaptive Equipment: TBD     Home Living: Pt lives at [facility]. Ambulates with w/walker    PLOF?? Pain Level: no pain this session ;  Cognition: A&O: id'ed place and month, unsure of year. Pleasant, following simple commands, conversing              Memory:  fair +              Sequencing:  fair+              Problem solving:  fair               Judgement/safety:  fair                 Functional Assessment:  AM-PAC Daily Activity Raw Score: 13/24    Initial Eval Status  Date: 8/16/22 Treatment Status  Date: 8/17/22 STGs = LTGs  Time frame: 10-14 days   Feeding SBA/set-up   Supervision    Grooming Mod A, sitting  Decrease standing balance/tolerance   SBA    UB Dressing Mod A  mod A to arrange gown SBA    LB Dressing Max A/dependent Max A, able to reach feet to pull up socks. Mod A    Bathing Max A   Min A   Toileting Dependent   Mod A    Bed Mobility  Max A  Supine <>sit   Min A   Functional Transfers Patient sat EOB  only  Felt fatigued and wanted to return to bed Sit <> stand mod A  Mod  A   Functional Mobility NT Min/mod A with WW in room, patient unsteady. Mod A  with good tolerance   Balance Sitting:    Static:  Min/CGA     Dynamic:Max A   Standing: NT  min/mod A with WW, patient occasionally swaying with decreased balance. SBA/supervision - sitting  Jd/CGA -standing    Activity Tolerance No SOB  Fatigued with light activity  fair, fatigued with upright activity Fair +  with ADL activity   Visual/  Perceptual Glasses: none by bedside          UE ROM/strength AROM present throughout     Fair + strength   Tolerate UE therapeutic activity/exercise to increase strength and endurance for ADL activity      Comments:  patient cleared with nursing and agreeable to session. Patient fatigued but improvement demonstrated.  Patient in chair with call light in reach and alarm on    Education/treatment: ADL and functional transfer/activity performed to increase safety and independence during self care tasks. Education provided on safety awareness, adl reeducation, functional transfer training    Pt has made progress towards set goals.      Time In: 9:54  Time Out: 10:06     Min Units   Therapeutic Ex 19855     Therapeutic Activities 97507 16 2   ADL/Self Care 27487     Orthotic Management 86961     Neuro Re-Ed 05475     Non-Billable Time     TOTAL TIMED TREATMENT 12 [address] MEDELL[place]/L [address]

## 2022-08-17 NOTE — PROGRESS NOTES
[facility] Hospitalist   Progress Note    Admitting Date and Time: 8/14/2022  6:18 AM  Admit Dx: GI bleed [K92.2]    Subjective:    Pt sitting up in chair in no acute distress. Working with PT initial visit. States she feels well. Pre-authorization initiated with [facility]. Denies any new concerns at this time    Per RN: Awaiting precert.     ROS: denies fever, chills, cp, sob, n/v, HA unless stated above.     sodium chloride flush  5-40 mL IntraVENous 2 times per day    atorvastatin  40 mg Oral Daily    FLUoxetine  40 mg Oral Daily    gabapentin  100 mg Oral TID    insulin lispro  0-4 Units SubCUTAneous TID WC    insulin lispro  0-4 Units SubCUTAneous Nightly    pantoprazole (PROTONIX) 40 mg injection  40 mg IntraVENous BID     sodium chloride flush, 5-40 mL, PRN  sodium chloride, , PRN  fentanNYL, 25 mcg, Q5 Min PRN  labetalol, 5 mg, Q15 Min PRN   Or  hydrALAZINE, 5 mg, Q15 Min PRN  polyethylene glycol, 17 g, Daily PRN  glucose, 4 tablet, PRN  dextrose bolus, 125 mL, PRN   Or  dextrose bolus, 250 mL, PRN  glucagon (rDNA), 1 mg, PRN  dextrose, , Continuous PRN  acetaminophen, 650 mg, Q4H PRN       Objective:    BP (!) 141/55   Pulse 64   Temp 98.4 °F (36.9 °C)   Resp 16   Ht 5' 8\" (1.727 m)   Wt 202 lb (91.6 kg)   SpO2 95%   BMI 30.71 kg/m²   General Appearance: alert and oriented to person, place and time and in no acute distress  Skin: warm and dry  Head: normocephalic and atraumatic  Eyes: pupils equal, round, and reactive to light, extraocular eye movements intact, conjunctivae normal  Neck: neck supple and non tender without mass   Pulmonary/Chest: clear to auscultation bilaterally- no wheezes, rales or rhonchi, normal air movement, no respiratory distress  Cardiovascular: normal rate, normal S1 and S2 and no RGM  Abdomen: soft, non-tender, non-distended, normal bowel sounds  Extremities: no cyanosis, no clubbing and no edema  Neurologic: no cranial nerve deficit and speech normal      Recent Labs     08/14/22  1050 08/15/22  0311 08/16/22  0440    133 136   K 4.3 4.5 4.0    105 106   CO2 23 22 20*   BUN 49* 48* 35*   CREATININE 1.1* 1.5* 1.2*   GLUCOSE 229* 141* 199*   CALCIUM 8.2* 8.5* 8.3*         No results for input(s): ALKPHOS, PROT, LABALBU, BILITOT, AST, ALT in the last 72 hours. Recent Labs     08/14/22  1050 08/15/22  1029 08/16/22  0440   WBC 9.7 8.6 7.3   RBC 2.72* 2.59* 2.45*   HGB 7.7* 7.5* 7.2*   HCT 23.5* 23.2* 22.4*   MCV 86.4 89.6 91.4   MCH 28.3 29.0 29.4   MCHC 32.8 32.3 32.1   RDW 15.2* 16.0* 15.4*    230 214   MPV 8.9 8.9 9.0              Radiology:   No orders to display       Assessment:  Principal Problem:    GI bleed  Active Problems:    Melena    Anemia associated with acute blood loss    Uncontrolled type 2 diabetes mellitus with hyperglycemia ([facility] [address]    Hyperlipidemia  Resolved Problems:    * No resolved hospital problems. *      Plan:  GI Bleed- Noted  dark stools for unknown length of time. Upon presentation to [place] ED Does take naproxen 250mg BID. Hgb 5.5. Received 2U PRBC. Pantoprazole infusion DC. Transferred to [facility] for specialty evaluation. Trend H/H. Continue Pantoprazole BID  Hgb 7.4. 8/15/2022 S/P EGD Revealing Moderately sized Antral Ucer. Gastritis sever at the antrum. Duodenitis. Bx to r/o sprue/H. Pylori. GI input appreciated. Recent Fall- likely 2/2 above. Pt states she has had numerous falls. Does have Hx vertigo, however pt states she does not recall being dizzy/lightheaded at time of fall. Check orthostatic bp. PT/OT. ASYA Lung Opacity- CXR 3.7cm Opacity in ASYA may be underlying mass. Labs pending. Consider Pulmonology input. HTN- Continue losartan. Holding Bumetanide. DM II- Check A1C. SSI/Hypoglycemic protocol/POCGT/CLD. Resume Carb control diet when ok with GI Follow adjust as needed. HLD- Atorvastatin. Disposition- [facility]. [address] [address] for [facility] per GI. Hgb stable. [facility] 12/24. Case Management following. NOTE: This report was transcribed using voice recognition software. Every effort was made to ensure accuracy; however, inadvertent computerized transcription errors may be present. Electronically signed by Go Alcantar CNP on 8/17/2022 at 7:40 AM  HOSPITALIST ATTENDING PHYSICIAN NOTE 8/17/2022 1611PM:    Details of the evaluation - subjective assessment (including medication profile, past medical, family and social history when applicable), examination, review of lab and test data, diagnostic impressions and medical decision making - performed by Jolinda Borer. [place] Fennel - CNP, were discussed with me on the date of service and I agree with clinical information herein unless otherwise noted. The patient has been evaluated by me personally earlier today. Pt reports no fevers, chills,n/v.     Exam: heart reg at rate of 68,lungs cta, abd pos bs soft nt, ext neg for le edema    I agree with the assessment and plan of Khadijah Alcantar CNP    Melena/Gi bleed  Darrelyn Ganja  Antritis with antral ulcer  duodenitis  Anemia with acute blood component  Dm type 2 uncontrolled  Htn  Hyperlipidemia  Back pain    Electronically signed by Augustus Arora D.O.   Hospitalist  [facility] Hospitalist Service at [facility]

## 2022-08-17 NOTE — PROGRESS NOTES
PROGRESS NOTE        Patient Presents with/Seen in Consultation For      *Reason for Consult: GIB  CHIEF COMPLAINT:  recent falls    Subjective:     Patient seen Jaswinder Deejay in bed in no apparent distress. Hemoglobin stable. No gross signs of bleeding reported. Complaints of abdominal pain, nausea or vomiting. Tolerating diet. Review of Systems  Aside from what was mentioned in the PMH and HPI, essentially unremarkable, all others negative. Objective:     BP (!) 141/55   Pulse 64   Temp 98.4 °F (36.9 °C)   Resp 16   Ht 5' 8\" (1.727 m)   Wt 202 lb (91.6 kg)   SpO2 95%   BMI 30.71 kg/m²     General appearance: alert, awake, pale laying in bed, and cooperative  Eyes: conjunctiva pale, sclera anicteric. PERRL.   Lungs: clear to auscultation bilaterally  Heart: regular rate and rhythm, no murmur, 2+ pulses; no edema  Abdomen: soft, non-tender; bowel sounds normal; no masses,  no organomegaly  Extremities: extremities without edema  Pulses: 2+ and symmetric  Skin: Skin color pale, texture, turgor normal.  Neurologic: Grossly normal    pantoprazole (PROTONIX) tablet 40 mg, BID AC  0.9 % sodium chloride infusion, Continuous  sodium chloride flush 0.9 % injection 5-40 mL, 2 times per day  sodium chloride flush 0.9 % injection 5-40 mL, PRN  0.9 % sodium chloride infusion, PRN  fentaNYL (SUBLIMAZE) injection 25 mcg, Q5 Min PRN  labetalol (NORMODYNE;TRANDATE) injection 5 mg, Q15 Min PRN   Or  hydrALAZINE (APRESOLINE) injection 5 mg, Q15 Min PRN  atorvastatin (LIPITOR) tablet 40 mg, Daily  FLUoxetine (PROZAC) capsule 40 mg, Daily  gabapentin (NEURONTIN) capsule 100 mg, TID  polyethylene glycol (GLYCOLAX) packet 17 g, Daily PRN  glucose chewable tablet 16 g, PRN  dextrose bolus 10% 125 mL, PRN   Or  dextrose bolus 10% 250 mL, PRN  glucagon (rDNA) injection 1 mg, PRN  dextrose 10 % infusion, Continuous PRN  insulin lispro (HUMALOG) injection vial 0-4 Units, TID WC  insulin lispro (HUMALOG) injection vial 0-4 Units, Nightly  acetaminophen (TYLENOL) tablet 650 mg, Q4H PRN       Data Review  CBC:   Lab Results   Component Value Date/Time    WBC 7.1 08/17/2022 08:38 AM    RBC 2.58 08/17/2022 08:38 AM    HGB 7.4 08/17/2022 08:38 AM    HCT 23.8 08/17/2022 08:38 AM    MCV 92.2 08/17/2022 08:38 AM    MCH 28.7 08/17/2022 08:38 AM    MCHC 31.1 08/17/2022 08:38 AM    RDW 15.5 08/17/2022 08:38 AM     08/17/2022 08:38 AM    MPV 9.2 08/17/2022 08:38 AM     CMP:    Lab Results   Component Value Date/Time     08/17/2022 08:38 AM    K 4.1 08/17/2022 08:38 AM     08/17/2022 08:38 AM    CO2 23 08/17/2022 08:38 AM    BUN 27 08/17/2022 08:38 AM    CREATININE 1.1 08/17/2022 08:38 AM    GFRAA 57 08/17/2022 08:38 AM    LABGLOM 47 08/17/2022 08:38 AM    GLUCOSE 212 08/17/2022 08:38 AM    CALCIUM 8.7 08/17/2022 08:38 AM       PT/INR:    Lab Results   Component Value Date/Time    PROTIME 11.5 08/15/2022 09:45 AM    INR 1.0 08/15/2022 09:45 AM         Assessment:     Active Problems:  Melanotic stools- +occult at [place]  Anemia  Epigastric pain  Excess NSAID use  PMHx of colon polyps  ASYA mass- per CT from [place]  Recent falls  EGD 8/15/22- Grade B LA classification GERD. Stomach showed moderate antral ulcer and severe antritis. Biopsies were done to rule out H. pylori infection. Duodenum showed some duodenal atrophy at the bulb. Biopsies were done to rule out sprue. Plan:   Protonix 40 MG oral BID  GERD diet   Hgb stable, no gross signs of bleeding  Supportive care  Monitor CBC daily  Monitor stools  Discharge per PCP, ok from GI POV with outpatient follow up visit       Note: This report was completed utilizing computer voice recognition software. Every effort has been made to ensure accuracy, however; inadvertent computerized transcription errors may be present.      Discussed with Dr. Gayle Steele per Dr. Jewels Zamora APRN-NP-C 8/17/2022  12:16 PM For Dr. Gilda Resendez

## 2022-08-17 NOTE — PROGRESS NOTES
Physical Therapy  Facility/Department: [facility] SURG  Physical Therapy Treatment Note    Name: Sybil Castellanos  : 1933  MRN: [id number]  Date of Service: 2022    Discharge Recommendations:             Patient Diagnosis(es): The encounter diagnosis was Gastrointestinal hemorrhage, unspecified gastrointestinal hemorrhage type. Past Medical History:  has a past medical history of Anxiety, Dementia ([facility] DM II (diabetes mellitus, type II), controlled ([facility] Essential (primary) hypertension, GERD (gastroesophageal reflux disease), HLD (hyperlipidemia), and Vertigo. Past Surgical History:  has a past surgical history that includes Breast surgery (Left); ORIF femur fracture (Right); and Upper gastrointestinal endoscopy (N/A, 8/15/2022). Evaluating Therapist: Kaushal Atkinson PT     Referring Provider:        Torrance Gaucher, APRN - CNP       PT order : PT eval and treat     Room #: 617  DIAGNOSIS:  GIB   Additional Pertinent History:multiple falls at [facility]   PRECAUTIONS:  falls     Social:  Pt lives at [address]  in a 1 floor plan   Prior to admission pt walked with  ww. Pt reports she was independent with mobility, but does fall      Initial Evaluation  Date: 8/15/2022  Treatment  2022    Short Term/ Long Term   Goals   Was pt agreeable to Eval/treatment? Yes  yes    Does pt have pain? LBP and LEs  No complaints    Bed Mobility  Rolling:   Supine to sit: max assist   Sit to supine:  NT   Scooting:  min assist in sit  NT  Min assist    Transfers Sit to stand:  mod assist with elevated bed   Stand to sit: min assist   Stand pivot:  NT  Sit <> stand:  Mod A CGA    Ambulation     4 steps  with  ww  with  min assist  25 feet x 1 using WW for support Min/Mod A for balance  50  feet with  ww  with  SBA        Stair negotiation: ascended and descended NT  NT N/A    LE ROM  Grossly WFL      LE strength  3-/ 5 hips, 4-/ 5 distally   Increase by 1-2/ 3 MMT grade    AM- PAC RAW score    Pt is alert and able to follow instruction  Balance: poor dynamic using [employer] for support    Pt performed therapeutic exercise of the following: NT    Patient education/treatment  Pt was educated on UE usage to assist with transfer safety, gait mechanics promoting posture    Patient response to education:   Pt verbalized understanding Pt demonstrated skill Pt requires further education in this area   yes With prompt yes     ASSESSMENT:   Comments: nurse ok with Rx. Batsheva slow and inconsistent, Pt unsteady throughout, required constant hands on assist for balance and safety. Pt fatigued after activity. Pt unsafe to gait or transfer alone presently. Pt was left in a recliner chair as found with call light in reach, waffle cushion in place    Chair/bed alarm: chair alarm active    Time in 0952   Time out 1007   Total Treatment Time 15 minutes   CPT codes:     Therapeutic activities 25331 15 minutes   Therapeutic exercises 90550 0 minutes       Pt is making good progress toward established Physical Therapy goals. Continue with physical therapy current plan of care.     Jack Adamson PTA   License Number: [id number]

## 2022-08-17 NOTE — CARE COORDINATION
Social work / Discharge Planning:       Per RN note, family called in stating they wanted referral to [facility]. Referral called to admissions and clinical information faxed. Awaiting return call.    Electronically signed by DAVID Campa on 8/17/2022 at 9:49 AM

## 2022-08-17 NOTE — PROGRESS NOTES
Notification of IV to PO conversion: This patient's order for Protonix IV has been changed to PO as approved by the Pharmacy & Therapeutics and Medical Executive Committees. If the patient should become strict NPO while on this therapy, contact the prescriber for further orders. Thank Filbert Runner Pharm. D 8/17/2022 8:58 AM

## 2022-08-18 LAB
ALBUMIN SERPL-MCNC: 2.9 G/DL (ref 3.5–5.2)
ALP BLD-CCNC: 89 U/L (ref 35–104)
ALT SERPL-CCNC: 9 U/L (ref 0–32)
ANION GAP SERPL CALCULATED.3IONS-SCNC: 10 MMOL/L (ref 7–16)
AST SERPL-CCNC: 13 U/L (ref 0–31)
BACTERIA: ABNORMAL /HPF
BASOPHILS ABSOLUTE: 0.05 E9/L (ref 0–0.2)
BASOPHILS RELATIVE PERCENT: 0.7 % (ref 0–2)
BILIRUB SERPL-MCNC: 0.5 MG/DL (ref 0–1.2)
BILIRUBIN URINE: NEGATIVE
BLOOD, URINE: ABNORMAL
BUN BLDV-MCNC: 24 MG/DL (ref 6–23)
CALCIUM SERPL-MCNC: 8.4 MG/DL (ref 8.6–10.2)
CHLORIDE BLD-SCNC: 102 MMOL/L (ref 98–107)
CLARITY: CLEAR
CO2: 21 MMOL/L (ref 22–29)
COLOR: YELLOW
CREAT SERPL-MCNC: 1.1 MG/DL (ref 0.5–1)
EOSINOPHILS ABSOLUTE: 0.24 E9/L (ref 0.05–0.5)
EOSINOPHILS RELATIVE PERCENT: 3.6 % (ref 0–6)
GFR AFRICAN AMERICAN: 57
GFR NON-AFRICAN AMERICAN: 47 ML/MIN/1.73
GLUCOSE BLD-MCNC: 172 MG/DL (ref 74–99)
GLUCOSE URINE: NEGATIVE MG/DL
HBA1C MFR BLD: 7.2 % (ref 4–5.6)
HCT VFR BLD CALC: 24.5 % (ref 34–48)
HEMOGLOBIN: 7.7 G/DL (ref 11.5–15.5)
IMMATURE GRANULOCYTES #: 0.02 E9/L
IMMATURE GRANULOCYTES %: 0.3 % (ref 0–5)
KETONES, URINE: NEGATIVE MG/DL
LEUKOCYTE ESTERASE, URINE: ABNORMAL
LYMPHOCYTES ABSOLUTE: 1.65 E9/L (ref 1.5–4)
LYMPHOCYTES RELATIVE PERCENT: 24.4 % (ref 20–42)
MCH RBC QN AUTO: 28.9 PG (ref 26–35)
MCHC RBC AUTO-ENTMCNC: 31.4 % (ref 32–34.5)
MCV RBC AUTO: 92.1 FL (ref 80–99.9)
METER GLUCOSE: 196 MG/DL (ref 74–99)
METER GLUCOSE: 197 MG/DL (ref 74–99)
METER GLUCOSE: 235 MG/DL (ref 74–99)
METER GLUCOSE: 297 MG/DL (ref 74–99)
MONOCYTES ABSOLUTE: 0.87 E9/L (ref 0.1–0.95)
MONOCYTES RELATIVE PERCENT: 12.9 % (ref 2–12)
NEUTROPHILS ABSOLUTE: 3.92 E9/L (ref 1.8–7.3)
NEUTROPHILS RELATIVE PERCENT: 58.1 % (ref 43–80)
NITRITE, URINE: POSITIVE
PDW BLD-RTO: 15.5 FL (ref 11.5–15)
PH UA: 6 (ref 5–9)
PLATELET # BLD: 252 E9/L (ref 130–450)
PMV BLD AUTO: 9.1 FL (ref 7–12)
POTASSIUM SERPL-SCNC: 3.7 MMOL/L (ref 3.5–5)
PROTEIN UA: 30 MG/DL
RBC # BLD: 2.66 E12/L (ref 3.5–5.5)
RBC UA: ABNORMAL /HPF (ref 0–2)
SODIUM BLD-SCNC: 133 MMOL/L (ref 132–146)
SPECIFIC GRAVITY UA: 1.02 (ref 1–1.03)
TOTAL PROTEIN: 5.8 G/DL (ref 6.4–8.3)
UROBILINOGEN, URINE: 2 E.U./DL
WBC # BLD: 6.8 E9/L (ref 4.5–11.5)
WBC UA: ABNORMAL /HPF (ref 0–5)

## 2022-08-18 PROCEDURE — 85025 COMPLETE CBC W/AUTO DIFF WBC: CPT

## 2022-08-18 PROCEDURE — 80053 COMPREHEN METABOLIC PANEL: CPT

## 2022-08-18 PROCEDURE — 2580000003 HC RX 258: Performed by: ANESTHESIOLOGY

## 2022-08-18 PROCEDURE — 87077 CULTURE AEROBIC IDENTIFY: CPT

## 2022-08-18 PROCEDURE — 81001 URINALYSIS AUTO W/SCOPE: CPT

## 2022-08-18 PROCEDURE — 6370000000 HC RX 637 (ALT 250 FOR IP): Performed by: INTERNAL MEDICINE

## 2022-08-18 PROCEDURE — 87186 SC STD MICRODIL/AGAR DIL: CPT

## 2022-08-18 PROCEDURE — 6370000000 HC RX 637 (ALT 250 FOR IP): Performed by: NURSE PRACTITIONER

## 2022-08-18 PROCEDURE — 87088 URINE BACTERIA CULTURE: CPT

## 2022-08-18 PROCEDURE — 99232 SBSQ HOSP IP/OBS MODERATE 35: CPT | Performed by: INTERNAL MEDICINE

## 2022-08-18 PROCEDURE — 2060000000 HC ICU INTERMEDIATE R&B

## 2022-08-18 PROCEDURE — 6360000002 HC RX W HCPCS: Performed by: NURSE PRACTITIONER

## 2022-08-18 PROCEDURE — 2580000003 HC RX 258: Performed by: NURSE PRACTITIONER

## 2022-08-18 PROCEDURE — 36415 COLL VENOUS BLD VENIPUNCTURE: CPT

## 2022-08-18 PROCEDURE — 82962 GLUCOSE BLOOD TEST: CPT

## 2022-08-18 PROCEDURE — 83036 HEMOGLOBIN GLYCOSYLATED A1C: CPT

## 2022-08-18 RX ORDER — CLARITHROMYCIN 500 MG/1
500 TABLET, COATED ORAL 2 TIMES DAILY
Status: DISCONTINUED | OUTPATIENT
Start: 2022-08-18 | End: 2022-08-19 | Stop reason: HOSPADM

## 2022-08-18 RX ORDER — AMOXICILLIN 250 MG/1
500 CAPSULE ORAL EVERY 12 HOURS SCHEDULED
Status: DISCONTINUED | OUTPATIENT
Start: 2022-08-18 | End: 2022-08-19 | Stop reason: HOSPADM

## 2022-08-18 RX ADMIN — FLUOXETINE HYDROCHLORIDE 40 MG: 20 CAPSULE ORAL at 09:28

## 2022-08-18 RX ADMIN — CLARITHROMYCIN 500 MG: 500 TABLET ORAL at 21:20

## 2022-08-18 RX ADMIN — GABAPENTIN 100 MG: 100 CAPSULE ORAL at 09:28

## 2022-08-18 RX ADMIN — PANTOPRAZOLE SODIUM 40 MG: 40 TABLET, DELAYED RELEASE ORAL at 06:37

## 2022-08-18 RX ADMIN — PIPERACILLIN AND TAZOBACTAM 3375 MG: 3; .375 INJECTION, POWDER, FOR SOLUTION INTRAVENOUS at 16:33

## 2022-08-18 RX ADMIN — INSULIN LISPRO 2 UNITS: 100 INJECTION, SOLUTION INTRAVENOUS; SUBCUTANEOUS at 12:45

## 2022-08-18 RX ADMIN — DESMOPRESSIN ACETATE 40 MG: 0.2 TABLET ORAL at 09:28

## 2022-08-18 RX ADMIN — Medication 10 ML: at 21:20

## 2022-08-18 RX ADMIN — PIPERACILLIN AND TAZOBACTAM 3375 MG: 3; .375 INJECTION, POWDER, FOR SOLUTION INTRAVENOUS at 01:45

## 2022-08-18 RX ADMIN — PANTOPRAZOLE SODIUM 40 MG: 40 TABLET, DELAYED RELEASE ORAL at 16:31

## 2022-08-18 RX ADMIN — Medication 10 ML: at 09:57

## 2022-08-18 RX ADMIN — SODIUM CHLORIDE: 9 INJECTION, SOLUTION INTRAVENOUS at 01:43

## 2022-08-18 RX ADMIN — GABAPENTIN 100 MG: 100 CAPSULE ORAL at 21:20

## 2022-08-18 RX ADMIN — CLARITHROMYCIN 500 MG: 500 TABLET ORAL at 14:00

## 2022-08-18 RX ADMIN — AMOXICILLIN 500 MG: 250 CAPSULE ORAL at 23:13

## 2022-08-18 RX ADMIN — POLYETHYLENE GLYCOL 3350 17 G: 17 POWDER, FOR SOLUTION ORAL at 09:47

## 2022-08-18 RX ADMIN — PIPERACILLIN AND TAZOBACTAM 3375 MG: 3; .375 INJECTION, POWDER, FOR SOLUTION INTRAVENOUS at 09:34

## 2022-08-18 RX ADMIN — TRAMADOL HYDROCHLORIDE 25 MG: 50 TABLET, COATED ORAL at 21:19

## 2022-08-18 RX ADMIN — INSULIN LISPRO 1 UNITS: 100 INJECTION, SOLUTION INTRAVENOUS; SUBCUTANEOUS at 16:37

## 2022-08-18 RX ADMIN — GABAPENTIN 100 MG: 100 CAPSULE ORAL at 14:00

## 2022-08-18 RX ADMIN — TRAMADOL HYDROCHLORIDE 25 MG: 50 TABLET, COATED ORAL at 06:37

## 2022-08-18 ASSESSMENT — PAIN DESCRIPTION - LOCATION
LOCATION: BACK

## 2022-08-18 ASSESSMENT — PAIN DESCRIPTION - ORIENTATION
ORIENTATION: LOWER

## 2022-08-18 ASSESSMENT — PAIN SCALES - GENERAL
PAINLEVEL_OUTOF10: 5
PAINLEVEL_OUTOF10: 0
PAINLEVEL_OUTOF10: 4
PAINLEVEL_OUTOF10: 4

## 2022-08-18 ASSESSMENT — PAIN - FUNCTIONAL ASSESSMENT
PAIN_FUNCTIONAL_ASSESSMENT: PREVENTS OR INTERFERES WITH MANY ACTIVE NOT PASSIVE ACTIVITIES
PAIN_FUNCTIONAL_ASSESSMENT: PREVENTS OR INTERFERES WITH MANY ACTIVE NOT PASSIVE ACTIVITIES
PAIN_FUNCTIONAL_ASSESSMENT: PREVENTS OR INTERFERES WITH ALL ACTIVE AND SOME PASSIVE ACTIVITIES

## 2022-08-18 ASSESSMENT — PAIN DESCRIPTION - DESCRIPTORS
DESCRIPTORS: ACHING

## 2022-08-18 ASSESSMENT — PAIN DESCRIPTION - FREQUENCY: FREQUENCY: INTERMITTENT

## 2022-08-18 ASSESSMENT — PAIN DESCRIPTION - ONSET: ONSET: ON-GOING

## 2022-08-18 NOTE — PROGRESS NOTES
[facility] notice of a positive urine culture with sensitivity from [facility] and that they were forwarding a fax of those results. Wild WONG notified and copy of faxed results placed in patient's soft chart on the unit.

## 2022-08-18 NOTE — PROGRESS NOTES
Department of Internal Medicine  General Internal Medicine  Resident Progress Note  CC: Anemia    SUBJECTIVE:    Reports that she is doing well. Denied fever and chills. No chest pain. She is aware of plans to continue to monitor her hgb and treat her UTI.     OBJECTIVE      Medications    Current Facility-Administered Medications: piperacillin-tazobactam (ZOSYN) 3,375 mg in dextrose 5 % 50 mL IVPB extended infusion (mini-bag), 3,375 mg, IntraVENous, Q8H  pantoprazole (PROTONIX) tablet 40 mg, 40 mg, Oral, BID AC  traMADol (ULTRAM) tablet 25 mg, 25 mg, Oral, Q4H PRN  0.9 % sodium chloride infusion, , IntraVENous, Continuous  sodium chloride flush 0.9 % injection 5-40 mL, 5-40 mL, IntraVENous, 2 times per day  sodium chloride flush 0.9 % injection 5-40 mL, 5-40 mL, IntraVENous, PRN  0.9 % sodium chloride infusion, , IntraVENous, PRN  fentaNYL (SUBLIMAZE) injection 25 mcg, 25 mcg, IntraVENous, Q5 Min PRN  labetalol (NORMODYNE;TRANDATE) injection 5 mg, 5 mg, IntraVENous, Q15 Min PRN **OR** hydrALAZINE (APRESOLINE) injection 5 mg, 5 mg, IntraVENous, Q15 Min PRN  atorvastatin (LIPITOR) tablet 40 mg, 40 mg, Oral, Daily  FLUoxetine (PROZAC) capsule 40 mg, 40 mg, Oral, Daily  gabapentin (NEURONTIN) capsule 100 mg, 100 mg, Oral, TID  polyethylene glycol (GLYCOLAX) packet 17 g, 17 g, Oral, Daily PRN  glucose chewable tablet 16 g, 4 tablet, Oral, PRN  dextrose bolus 10% 125 mL, 125 mL, IntraVENous, PRN **OR** dextrose bolus 10% 250 mL, 250 mL, IntraVENous, PRN  glucagon (rDNA) injection 1 mg, 1 mg, SubCUTAneous, PRN  dextrose 10 % infusion, , IntraVENous, Continuous PRN  insulin lispro (HUMALOG) injection vial 0-4 Units, 0-4 Units, SubCUTAneous, TID WC  insulin lispro (HUMALOG) injection vial 0-4 Units, 0-4 Units, SubCUTAneous, Nightly  acetaminophen (TYLENOL) tablet 650 mg, 650 mg, Oral, Q4H PRN  Physical    VITALS:  BP (!) 156/56   Pulse 67   Temp 98.3 °F (36.8 °C) (Oral)   Resp 13   Ht 5' 8\" (1.727 m)   Wt 203 lb 8 oz (92.3 kg)   SpO2 97%   BMI 30.94 kg/m²   CONSTITUTIONAL:  awake, alert, and cooperative  EYES:  extra-ocular muscles intact and vision intact  ENT:  normocepalic, without obvious abnormality  NECK:  supple, symmetrical, trachea midline  HEMATOLOGIC/LYMPHATICS:  no cervical lymphadenopathy  LUNGS:  no increased work of breathing, no retractions, and clear to auscultation, no crackles or wheezing  CARDIOVASCULAR:  normal apical pulses, normal S1 and S2, and no edema  ABDOMEN:  normal bowel sounds, non-distended, and non-tender  MUSCULOSKELETAL:  there is no redness, warmth, or swelling of the joints  NEUROLOGIC:  Mental Status Exam:  Level of Alertness:   awake  Orientation:   person, place, time  SKIN:  no bruising or bleeding  Data    CBC:   Lab Results   Component Value Date/Time    WBC 6.8 08/18/2022 04:45 AM    RBC 2.66 08/18/2022 04:45 AM    HGB 7.7 08/18/2022 04:45 AM    HCT 24.5 08/18/2022 04:45 AM    MCV 92.1 08/18/2022 04:45 AM    MCH 28.9 08/18/2022 04:45 AM    MCHC 31.4 08/18/2022 04:45 AM    RDW 15.5 08/18/2022 04:45 AM     08/18/2022 04:45 AM    MPV 9.1 08/18/2022 04:45 AM     BMP:    Lab Results   Component Value Date/Time     08/18/2022 04:45 AM    K 3.7 08/18/2022 04:45 AM    K 4.1 08/17/2022 08:38 AM     08/18/2022 04:45 AM    CO2 21 08/18/2022 04:45 AM    BUN 24 08/18/2022 04:45 AM    LABALBU 2.9 08/18/2022 04:45 AM    CREATININE 1.1 08/18/2022 04:45 AM    CALCIUM 8.4 08/18/2022 04:45 AM    GFRAA 57 08/18/2022 04:45 AM    LABGLOM 47 08/18/2022 04:45 AM    GLUCOSE 172 08/18/2022 04:45 AM       ASSESSMENT AND PLAN      1. Acute Anemia due GI bleed/loss   EGD positive for Ulcer. Bx taken and report pending. Currently on PPI. Hgb has remained stable. Continue current management. Monitor cbc daily. 2.  Uncontrolled type 2 diabetes mellitus with hyperglycemia: No prior A1c on chart. BGL fairly controlled. Will continue to monitor.  Possible initiation of oral antihyperglycemic agent. Continue to monitor bgl. Diabetic diet. Accucheck with [facility].  Sliding scale insulin for now. Will decide on antihyperglycemic agent when A1C results. 3.  Hyperlipidemia: continue statin    4. Hypertension Essential: since BP is improved now, will restart Losartan. 5.  Frequent Falls: fall precaution. Continue PT/OT. Out of bed with assistance. 6.  T2/T4 compression fracture: pain control. PT/OT. Avoid fall. 7. No X ray in the chart. So unsure of ASYA opacity finding is coming from.

## 2022-08-18 NOTE — PROGRESS NOTES
PROGRESS NOTE        Patient Presents with/Seen in Consultation For      *Reason for Consult: GIB  CHIEF COMPLAINT:  recent falls    Subjective:     Patient seen laying in bed, in NAD. Reports frustration with unable to get her son on the phone. Tolerating diet. Denies any N/V, or abdominal pain. Soft brown stool last night, flatus today. POC reviewed with the patient, all questions answered. Review of Systems  Aside from what was mentioned in the PMH and HPI, essentially unremarkable, all others negative. Objective:     BP (!) 156/56   Pulse 67   Temp 98.3 °F (36.8 °C) (Oral)   Resp 13   Ht 5' 8\" (1.727 m)   Wt 203 lb 8 oz (92.3 kg)   SpO2 97%   BMI 30.94 kg/m²     General appearance: alert, awake, pale laying in bed, and cooperative  Eyes: conjunctiva pale, sclera anicteric. PERRL.   Lungs: clear to auscultation bilaterally  Heart: regular rate and rhythm, no murmur, 2+ pulses; no edema  Abdomen: soft, non-tender; bowel sounds normal; no masses  Extremities: extremities without edema  Pulses: 2+ and symmetric  Skin: Skin color pale, texture, turgor normal.  Neurologic: Grossly normal    piperacillin-tazobactam (ZOSYN) 3,375 mg in dextrose 5 % 50 mL IVPB extended infusion (mini-bag), Q8H  pantoprazole (PROTONIX) tablet 40 mg, BID AC  traMADol (ULTRAM) tablet 25 mg, Q4H PRN  0.9 % sodium chloride infusion, Continuous  sodium chloride flush 0.9 % injection 5-40 mL, 2 times per day  sodium chloride flush 0.9 % injection 5-40 mL, PRN  0.9 % sodium chloride infusion, PRN  fentaNYL (SUBLIMAZE) injection 25 mcg, Q5 Min PRN  labetalol (NORMODYNE;TRANDATE) injection 5 mg, Q15 Min PRN   Or  hydrALAZINE (APRESOLINE) injection 5 mg, Q15 Min PRN  atorvastatin (LIPITOR) tablet 40 mg, Daily  FLUoxetine (PROZAC) capsule 40 mg, Daily  gabapentin (NEURONTIN) capsule 100 mg, TID  polyethylene glycol (GLYCOLAX) packet 17 g, Daily PRN  glucose chewable tablet 16 g, PRN  dextrose bolus 10% 125 mL, PRN   Or  dextrose bolus 10% 250 mL, PRN  glucagon (rDNA) injection 1 mg, PRN  dextrose 10 % infusion, Continuous PRN  insulin lispro (HUMALOG) injection vial 0-4 Units, TID WC  insulin lispro (HUMALOG) injection vial 0-4 Units, Nightly  acetaminophen (TYLENOL) tablet 650 mg, Q4H PRN       Data Review  CBC:   Lab Results   Component Value Date/Time    WBC 6.8 08/18/2022 04:45 AM    RBC 2.66 08/18/2022 04:45 AM    HGB 7.7 08/18/2022 04:45 AM    HCT 24.5 08/18/2022 04:45 AM    MCV 92.1 08/18/2022 04:45 AM    MCH 28.9 08/18/2022 04:45 AM    MCHC 31.4 08/18/2022 04:45 AM    RDW 15.5 08/18/2022 04:45 AM     08/18/2022 04:45 AM    MPV 9.1 08/18/2022 04:45 AM     CMP:    Lab Results   Component Value Date/Time     08/18/2022 04:45 AM    K 3.7 08/18/2022 04:45 AM    K 4.1 08/17/2022 08:38 AM     08/18/2022 04:45 AM    CO2 21 08/18/2022 04:45 AM    BUN 24 08/18/2022 04:45 AM    CREATININE 1.1 08/18/2022 04:45 AM    GFRAA 57 08/18/2022 04:45 AM    LABGLOM 47 08/18/2022 04:45 AM    GLUCOSE 172 08/18/2022 04:45 AM    PROT 5.8 08/18/2022 04:45 AM    LABALBU 2.9 08/18/2022 04:45 AM    CALCIUM 8.4 08/18/2022 04:45 AM    BILITOT 0.5 08/18/2022 04:45 AM    ALKPHOS 89 08/18/2022 04:45 AM    AST 13 08/18/2022 04:45 AM    ALT 9 08/18/2022 04:45 AM       PT/INR:    Lab Results   Component Value Date/Time    PROTIME 11.5 08/15/2022 09:45 AM    INR 1.0 08/15/2022 09:45 AM         Assessment:     Active Problems:  Melanotic stools- +occult at [place]  Anemia  Epigastric pain  Excess NSAID use  PMHx of colon polyps  ASYA mass- per CT from [place]  Recent falls  EGD 8/15/22- Grade B LA classification GERD. Stomach showed moderate antral ulcer and severe antritis. Biopsies were done to rule out H. pylori infection- positive. Duodenum showed some duodenal atrophy at the bulb. Biopsies were done to rule out sprue.     Plan:     Protonix 40 MG oral BID  Biaxin 500 mg PO BID x 10 Days  Amoxicillin 500 MG PO BID x 10 days  GERD diet   Hgb stable, no gross signs of bleeding  Supportive care  Monitor CBC daily  Monitor stools  Discharge per PCP, ok from GI POV with outpatient follow up visit     Discussed with Dr. Briseida Jackson per Dr. Linwood Feliciano, APRN - CNP 8/18/2022  9:00 AM For Dr. Yuliya Raman

## 2022-08-18 NOTE — PLAN OF CARE
Received call from nursing staff regarding urine cultures from [place]. Staff reports urine positive for Klebsiella. UA and CNS repeated. UA positive for nitrates. Culture pending. Will start Zosyn.

## 2022-08-18 NOTE — CARE COORDINATION
Social work / Discharge Planning:        Patient accepted by [facility] and will not need to wait for precert approval.    Patient can discharge when medically stable. Will need negative covid result on day of discharge. MARYAM, [id number] and ambulette form completed.    Electronically signed by DAVID Alvarado on 8/18/2022 at 9:39 AM

## 2022-08-18 NOTE — PROGRESS NOTES
[facility] Quality Flow/Interdisciplinary Rounds Progress Note        Quality Flow Rounds held on August 18, 2022    Disciplines Attending:  Bedside Nurse, , , and Nursing Unit Leadership    Miesha Lucio was admitted on 8/14/2022  6:18 AM    Anticipated Discharge Date:       Disposition:    Azk Score:  Zak Scale Score: 18    Readmission Risk              Risk of Unplanned Readmission:  8           Discussed patient goal for the day, patient clinical progression, and barriers to discharge. The following Goal(s) of the Day/Commitment(s) have been identified:  Monitor Hgb, continue IV hydration.        Luanne Olivera RN  August 18, 2022

## 2022-08-18 NOTE — DISCHARGE INSTR - COC
Continuity of Care Form    Patient Name: Sybil Castellanos   :  1933  MRN:  [id number]    Admit date:  2022  Discharge date:  2022    Code Status Order: DNR-CCA   Advance Directives:   [facility] Documentation       Date/Time Healthcare Directive Type of Healthcare Directive Copy in [address] [facility] [address] Agent's Name Healthcare Agent's Phone Number    08/15/22 0009 Yes, patient has an advance directive for healthcare treatment Durable power of  for health care Yes, copy in chart -- -- --            Admitting Physician:  Pilar Rosales MD  PCP: Lupis Morgan    Discharging Nurse: [facility] Unit/Room#: 7483/6633-G  Discharging Unit Phone Number: [phone]    Emergency Contact:   Extended Emergency Contact Information  Primary Emergency Contact: [address] [place] [address] Phone: [phone]  Mobile Phone: [phone]  Relation: Child   needed? No    Past Surgical History:  Past Surgical History:   Procedure Laterality Date    BREAST SURGERY Left     ORIF FEMUR FRACTURE Right     UPPER GASTROINTESTINAL ENDOSCOPY N/A 8/15/2022    EGD BIOPSY performed by Mariam Moser MD at [facility] ENDOSCOPY       Immunization History: There is no immunization history on file for this patient.     Active Problems:  Patient Active Problem List   Diagnosis Code    GI bleed K92.2    Melena K92.1    Anemia associated with acute blood loss D62    Uncontrolled type 2 diabetes mellitus with hyperglycemia (HCC) E11.65    Hyperlipidemia E78.5       Isolation/Infection:   Isolation            No Isolation          Patient Infection Status       None to display            Nurse Assessment:  Last Vital Signs: BP (!) 156/56   Pulse 67   Temp 98.3 °F (36.8 °C) (Oral)   Resp 13   Ht 5' 8\" (1.727 m)   Wt 203 lb 8 oz (92.3 kg)   SpO2 97%   BMI 30.94 kg/m²     Last documented pain score (0-10 scale): Pain Level: 5  Last Weight:   Wt Readings from Last 1 Encounters:   08/18/22 203 lb 8 oz (92.3 kg)     Mental Status:  oriented, alert, and disoriented at times    IV Access:  - None    Nursing Mobility/ADLs:  Walking   Assisted  Transfer  Assisted  Bathing  Assisted  Dressing  Assisted  Toileting  Assisted  Feeding  Assisted  Med Admin  Assisted  Med Delivery   whole    Wound Care Documentation and Therapy:        Elimination:  Continence: Bowel: Yes  Bladder: No  Urinary Catheter: None   Colostomy/Ileostomy/Ileal Conduit: No       Date of Last BM: 08/18/2022    Intake/Output Summary (Last 24 hours) at 8/18/2022 0939  Last data filed at 8/18/2022 0527  Gross per 24 hour   Intake 120 ml   Output 825 ml   Net -705 ml     I/O last 3 completed shifts: In: 240 [P.O.:240]  Out: 1225 [Urine:1225]    Safety Concerns:     History of Falls (last 30 days)    Impairments/Disabilities:      Hearing    Nutrition Therapy:  Current Nutrition Therapy:   - Oral Diet:  Dysphagia 3 advanced    Routes of Feeding: Oral  Liquids: Thin Liquids  Daily Fluid Restriction: no  Last Modified Barium Swallow with Video (Video Swallowing Test): not done    Treatments at the Time of Hospital Discharge:   Respiratory Treatments: n/a  Oxygen Therapy:  is not on home oxygen therapy. Ventilator:    - No ventilator support    Rehab Therapies: Physical Therapy and Occupational Therapy  Weight Bearing Status/Restrictions: No weight bearing restrictions  Other Medical Equipment (for information only, NOT a DME order):  walker  Other Treatments: n/a    Patient's personal belongings (please select all that are sent with patient):  Lola    RN SIGNATURE:  Electronically signed by Melvin Gautam RN on 8/19/22 at 10:19 AM EDT    CASE MANAGEMENT/SOCIAL WORK SECTION    Inpatient Status Date: ***    Readmission Risk Assessment Score:  Readmission Risk              Risk of Unplanned Readmission:  9           Discharging to Facility/ Agency   Name: [facility]  Address:[address] [place] [address] [address]  YBHXY:[phone]  UPF:[phone]    Dialysis Facility (if applicable)   Name:  Address:  Dialysis Schedule:  Phone:  Fax:    / signature: Electronically signed by DAVID Montemayor on 8/18/22 at 9:40 AM EDT    PHYSICIAN SECTION    Prognosis: {Prognosis:5232803212}    Condition at Discharge: Stable    Rehab Potential (if transferring to Rehab): {Prognosis:1654552231}    Recommended Labs or Other Treatments After Discharge: ***    Physician Certification: I certify the above information and transfer of Timoteo [place]  is necessary for the continuing treatment of the diagnosis listed and that she requires [facility] for less 30 days.      Update Admission H&P: {CHP DME Changes in KXHXW:[id number]}    PHYSICIAN SIGNATURE:  {Esignature:[id number]}

## 2022-08-18 NOTE — PLAN OF CARE
Problem: Discharge Planning  Goal: Discharge to home or other facility with appropriate resources  Outcome: Progressing     Problem: ABCDS Injury Assessment  Goal: Absence of physical injury  Outcome: Progressing     Problem: Safety - Adult  Goal: Free from fall injury  Outcome: Progressing     Problem: Pain  Goal: Verbalizes/displays adequate comfort level or baseline comfort level  Outcome: Progressing     Problem: Chronic Conditions and Co-morbidities  Goal: Patient's chronic conditions and co-morbidity symptoms are monitored and maintained or improved  Outcome: Progressing     Problem: Skin/Tissue Integrity  Goal: Absence of new skin breakdown  Description: 1. Monitor for areas of redness and/or skin breakdown  2. Assess vascular access sites hourly  3. Every 4-6 hours minimum:  Change oxygen saturation probe site  4. Every 4-6 hours:  If on nasal continuous positive airway pressure, respiratory therapy assess nares and determine need for appliance change or resting period.   Outcome: Progressing

## 2022-08-19 VITALS
RESPIRATION RATE: 16 BRPM | OXYGEN SATURATION: 98 % | DIASTOLIC BLOOD PRESSURE: 56 MMHG | HEIGHT: 68 IN | HEART RATE: 71 BPM | SYSTOLIC BLOOD PRESSURE: 170 MMHG | BODY MASS INDEX: 30.62 KG/M2 | WEIGHT: 202 LBS | TEMPERATURE: 98.1 F

## 2022-08-19 LAB
ANION GAP SERPL CALCULATED.3IONS-SCNC: 7 MMOL/L (ref 7–16)
BASOPHILS ABSOLUTE: 0.06 E9/L (ref 0–0.2)
BASOPHILS RELATIVE PERCENT: 0.8 % (ref 0–2)
BUN BLDV-MCNC: 19 MG/DL (ref 6–23)
CALCIUM SERPL-MCNC: 8.4 MG/DL (ref 8.6–10.2)
CHLORIDE BLD-SCNC: 104 MMOL/L (ref 98–107)
CO2: 23 MMOL/L (ref 22–29)
CREAT SERPL-MCNC: 1.1 MG/DL (ref 0.5–1)
EOSINOPHILS ABSOLUTE: 0.3 E9/L (ref 0.05–0.5)
EOSINOPHILS RELATIVE PERCENT: 3.8 % (ref 0–6)
GFR AFRICAN AMERICAN: 57
GFR NON-AFRICAN AMERICAN: 47 ML/MIN/1.73
GLUCOSE BLD-MCNC: 171 MG/DL (ref 74–99)
HCT VFR BLD CALC: 23 % (ref 34–48)
HEMOGLOBIN: 7.4 G/DL (ref 11.5–15.5)
IMMATURE GRANULOCYTES #: 0.02 E9/L
IMMATURE GRANULOCYTES %: 0.3 % (ref 0–5)
LYMPHOCYTES ABSOLUTE: 1.78 E9/L (ref 1.5–4)
LYMPHOCYTES RELATIVE PERCENT: 22.8 % (ref 20–42)
MCH RBC QN AUTO: 29.1 PG (ref 26–35)
MCHC RBC AUTO-ENTMCNC: 32.2 % (ref 32–34.5)
MCV RBC AUTO: 90.6 FL (ref 80–99.9)
METER GLUCOSE: 153 MG/DL (ref 74–99)
METER GLUCOSE: 279 MG/DL (ref 74–99)
MONOCYTES ABSOLUTE: 0.96 E9/L (ref 0.1–0.95)
MONOCYTES RELATIVE PERCENT: 12.3 % (ref 2–12)
NEUTROPHILS ABSOLUTE: 4.69 E9/L (ref 1.8–7.3)
NEUTROPHILS RELATIVE PERCENT: 60 % (ref 43–80)
PDW BLD-RTO: 15.2 FL (ref 11.5–15)
PLATELET # BLD: 252 E9/L (ref 130–450)
PMV BLD AUTO: 8.9 FL (ref 7–12)
POTASSIUM SERPL-SCNC: 4.7 MMOL/L (ref 3.5–5)
RBC # BLD: 2.54 E12/L (ref 3.5–5.5)
SARS-COV-2, NAAT: NOT DETECTED
SODIUM BLD-SCNC: 134 MMOL/L (ref 132–146)
WBC # BLD: 7.8 E9/L (ref 4.5–11.5)

## 2022-08-19 PROCEDURE — 6360000002 HC RX W HCPCS: Performed by: NURSE PRACTITIONER

## 2022-08-19 PROCEDURE — 87635 SARS-COV-2 COVID-19 AMP PRB: CPT

## 2022-08-19 PROCEDURE — 2580000003 HC RX 258: Performed by: ANESTHESIOLOGY

## 2022-08-19 PROCEDURE — 6370000000 HC RX 637 (ALT 250 FOR IP): Performed by: INTERNAL MEDICINE

## 2022-08-19 PROCEDURE — 2580000003 HC RX 258: Performed by: NURSE PRACTITIONER

## 2022-08-19 PROCEDURE — 82962 GLUCOSE BLOOD TEST: CPT

## 2022-08-19 PROCEDURE — 6370000000 HC RX 637 (ALT 250 FOR IP): Performed by: NURSE PRACTITIONER

## 2022-08-19 PROCEDURE — 6370000000 HC RX 637 (ALT 250 FOR IP)

## 2022-08-19 PROCEDURE — 80048 BASIC METABOLIC PNL TOTAL CA: CPT

## 2022-08-19 PROCEDURE — 36415 COLL VENOUS BLD VENIPUNCTURE: CPT

## 2022-08-19 PROCEDURE — 99239 HOSP IP/OBS DSCHRG MGMT >30: CPT | Performed by: INTERNAL MEDICINE

## 2022-08-19 PROCEDURE — 85025 COMPLETE CBC W/AUTO DIFF WBC: CPT

## 2022-08-19 RX ORDER — BUMETANIDE 1 MG/1
0.5 TABLET ORAL DAILY
Status: DISCONTINUED | OUTPATIENT
Start: 2022-08-19 | End: 2022-08-19 | Stop reason: HOSPADM

## 2022-08-19 RX ORDER — PANTOPRAZOLE SODIUM 40 MG/1
40 TABLET, DELAYED RELEASE ORAL
Qty: 30 TABLET | Refills: 3 | DISCHARGE
Start: 2022-08-19

## 2022-08-19 RX ORDER — FERROUS SULFATE 325(65) MG
325 TABLET ORAL 2 TIMES DAILY
Qty: 180 TABLET | Refills: 1 | DISCHARGE
Start: 2022-08-19

## 2022-08-19 RX ORDER — LOSARTAN POTASSIUM 25 MG/1
TABLET ORAL
Status: COMPLETED
Start: 2022-08-19 | End: 2022-08-19

## 2022-08-19 RX ORDER — AMOXICILLIN 500 MG/1
500 CAPSULE ORAL EVERY 12 HOURS SCHEDULED
Qty: 18 CAPSULE | Refills: 0 | DISCHARGE
Start: 2022-08-19 | End: 2022-08-28

## 2022-08-19 RX ORDER — ALOGLIPTIN 6.25 MG/1
12.5 TABLET, FILM COATED ORAL DAILY
Status: DISCONTINUED | OUTPATIENT
Start: 2022-08-19 | End: 2022-08-19 | Stop reason: HOSPADM

## 2022-08-19 RX ORDER — LOSARTAN POTASSIUM 50 MG/1
100 TABLET ORAL ONCE
Status: COMPLETED | OUTPATIENT
Start: 2022-08-19 | End: 2022-08-19

## 2022-08-19 RX ORDER — TRAMADOL HYDROCHLORIDE 50 MG/1
50 TABLET ORAL EVERY 6 HOURS PRN
Qty: 21 TABLET | Refills: 0 | Status: SHIPPED | OUTPATIENT
Start: 2022-08-19 | End: 2022-08-26

## 2022-08-19 RX ORDER — CLARITHROMYCIN 500 MG/1
500 TABLET, COATED ORAL 2 TIMES DAILY
Qty: 17 TABLET | Refills: 0 | DISCHARGE
Start: 2022-08-19 | End: 2022-08-28

## 2022-08-19 RX ADMIN — DESMOPRESSIN ACETATE 40 MG: 0.2 TABLET ORAL at 08:27

## 2022-08-19 RX ADMIN — TRAMADOL HYDROCHLORIDE 25 MG: 50 TABLET, COATED ORAL at 06:15

## 2022-08-19 RX ADMIN — BUMETANIDE 0.5 MG: 1 TABLET ORAL at 08:28

## 2022-08-19 RX ADMIN — ALOGLIPTIN 12.5 MG: 6.25 TABLET, FILM COATED ORAL at 10:36

## 2022-08-19 RX ADMIN — LOSARTAN POTASSIUM 100 MG: 25 TABLET, FILM COATED ORAL at 10:42

## 2022-08-19 RX ADMIN — AMOXICILLIN 500 MG: 250 CAPSULE ORAL at 08:27

## 2022-08-19 RX ADMIN — FLUOXETINE HYDROCHLORIDE 40 MG: 20 CAPSULE ORAL at 08:27

## 2022-08-19 RX ADMIN — PIPERACILLIN AND TAZOBACTAM 3375 MG: 3; .375 INJECTION, POWDER, FOR SOLUTION INTRAVENOUS at 02:10

## 2022-08-19 RX ADMIN — GABAPENTIN 100 MG: 100 CAPSULE ORAL at 08:27

## 2022-08-19 RX ADMIN — Medication 10 ML: at 08:28

## 2022-08-19 RX ADMIN — LOSARTAN POTASSIUM 100 MG: 25 TABLET ORAL at 10:42

## 2022-08-19 RX ADMIN — CLARITHROMYCIN 500 MG: 500 TABLET ORAL at 08:28

## 2022-08-19 RX ADMIN — PANTOPRAZOLE SODIUM 40 MG: 40 TABLET, DELAYED RELEASE ORAL at 06:09

## 2022-08-19 ASSESSMENT — PAIN DESCRIPTION - ORIENTATION: ORIENTATION: LOWER

## 2022-08-19 ASSESSMENT — PAIN SCALES - GENERAL
PAINLEVEL_OUTOF10: 4
PAINLEVEL_OUTOF10: 0

## 2022-08-19 ASSESSMENT — PAIN DESCRIPTION - DESCRIPTORS: DESCRIPTORS: ACHING

## 2022-08-19 ASSESSMENT — PAIN - FUNCTIONAL ASSESSMENT: PAIN_FUNCTIONAL_ASSESSMENT: PREVENTS OR INTERFERES WITH MANY ACTIVE NOT PASSIVE ACTIVITIES

## 2022-08-19 ASSESSMENT — PAIN DESCRIPTION - LOCATION: LOCATION: BACK

## 2022-08-19 NOTE — PROGRESS NOTES
Discharge order noted, call placed to [facility] to pick at 1130, bedside RN updated. Toro Zhou called to update, unable to reach, left detailed message.      Electronically signed by Gina Anderson RN on 8/19/2022 at 10:45 AM

## 2022-08-19 NOTE — DISCHARGE SUMMARY
Physician Discharge Summary     Patient ID:  Han Peralta  [id number]  80 y.o.  8/22/1933    Admit date: 8/14/2022    Discharge date and time: 8/19/2022 9:52 AM    Admitting Physician: Belinda Powell MD     Discharge Physician: [facility]    Admission Diagnoses: GI bleed [K92.2]    Discharge Diagnoses:   Anemia due to GI loss  Hypertension Essential   DM type 2   JOAN  Hypertension Essential  Fall  Hyperlipidemia  T2/T4 compression Fracture  ASYA Opacity:     Admission Condition: poor    Discharged Condition: fair    Indication for Admission:     Hospital Course:   Patient was admitted with fall. Work up showed Anemia. She was transfused. She had EGD done. This showed some gastric antral ulcer. Biopsy taken and she was started on treatment for H. Pylori. She was found to have elevated BGL. A1c was 7.2. she was treated with sliding scale while in hospital. This was changed to [facility] [place] at time of discharge. She was found to have back pain with Lumbar X ray showing T22/T4 mild compression fractures. She was treated with pain medication and recommendation for outpatient follow up with neurosurgery. There was ASYA opacity found on outside hospital Imaging. This also needs outpatient follow up. Bp was elevated. BP meds were initially held but gradually reintroduced. Still elevated, but better. She was discharged on iron tablets. Time spent in discharge of patient is greater than 30 minutes. Consults: GI    Significant Diagnostic Studies: labs:     Treatments: IV hydration, antibiotics: Zosyn and amoxicillin, and clarithromycin and EGD    Discharge Exam:  BP (!) 170/56   Pulse 71   Temp 98.1 °F (36.7 °C) (Oral)   Resp 16   Ht 5' 8\" (1.727 m)   Wt 202 lb (91.6 kg)   SpO2 98%   BMI 30.71 kg/m²   General appearance: alert, appears stated age, and cooperative  Head: Normocephalic, without obvious abnormality, atraumatic  Eyes: conjunctivae/corneas clear. PERRL, EOM's intact.  Fundi benign. Ears: normal TM's and external ear canals both ears  Nose: Nares normal. Septum midline. Mucosa normal. No drainage or sinus tenderness. Lungs: clear to auscultation bilaterally  Heart: regular rate and rhythm, S1, S2 normal, no murmur, click, rub or gallop  Abdomen: soft, non-tender; bowel sounds normal; no masses,  no organomegaly  Extremities: extremities normal, atraumatic, no cyanosis or edema  Pulses: 2+ and symmetric    Disposition: SNF    In process/preliminary results:  Outstanding Order Results       Date and Time Order Name Status Description    8/18/2022 12:15 AM Culture, Urine In process     8/15/2022 12:00 AM Surgical Pathology In process             Patient Instructions:   Current Discharge Medication List        START taking these medications    Details   traMADol (ULTRAM) 50 MG tablet Take 1 tablet by mouth every 6 hours as needed for Pain for up to 7 days. Qty: 21 tablet, Refills: 0    Comments: Reduce doses taken as pain becomes manageable  Associated Diagnoses: Closed fracture of second thoracic vertebra with spinal cord injury with routine healing[facility]      clarithromycin (BIAXIN) 500 MG tablet Take 1 tablet by mouth 2 times daily for 17 doses  Qty: 17 tablet, Refills: 0      amoxicillin (AMOXIL) 500 MG capsule Take 1 capsule by mouth every 12 hours for 18 doses  Qty: 18 capsule, Refills: 0      pantoprazole (PROTONIX) 40 MG tablet Take 1 tablet by mouth 2 times daily (before meals)  Qty: 30 tablet, Refills: 3      SITagliptin (JANUVIA) 50 MG tablet Take 1 tablet by mouth daily  Qty: 90 tablet, Refills: 1      ferrous sulfate (IRON 325) 325 (65 Fe) MG tablet Take 1 tablet by mouth 2 times daily  Qty: 180 tablet, Refills: 1           CONTINUE these medications which have NOT CHANGED    Details   atorvastatin (LIPITOR) 40 MG tablet Take 40 mg by mouth in the morning. At bedtime. bumetanide (BUMEX) 0.5 MG tablet Take 0.5 mg by mouth in the morning.  In the morning. FLUoxetine (PROZAC) 20 MG capsule Take 40 mg by mouth in the morning. In the morning. polyethylene glycol (GLYCOLAX) 17 GM/SCOOP powder Take 17 g by mouth daily      losartan (COZAAR) 100 MG tablet Take 100 mg by mouth in the morning. In the morning. Cholecalciferol (VITAMIN D3) 125 MCG (5000 UT) TABS Take by mouth once a week      gabapentin (NEURONTIN) 100 MG capsule Take 100 mg by mouth in the morning and 100 mg at noon and 100 mg before bedtime. meclizine (ANTIVERT) 25 MG CHEW Take 25 mg by mouth as needed           STOP taking these medications       naproxen (NAPROSYN) 250 MG tablet Comments:   Reason for Stopping:             Activity: activity as tolerated  Diet: cardiac diet and diabetic diet  Wound Care: none needed    Follow-up with GI/PCP in 2 weeks.     SignedMelida Queenungwu  8/19/2022  9:52 AM

## 2022-08-19 NOTE — PLAN OF CARE
Problem: Discharge Planning  Goal: Discharge to home or other facility with appropriate resources  Outcome: Completed     Problem: ABCDS Injury Assessment  Goal: Absence of physical injury  Outcome: Completed     Problem: Safety - Adult  Goal: Free from fall injury  Outcome: Completed     Problem: Pain  Goal: Verbalizes/displays adequate comfort level or baseline comfort level  Outcome: Completed     Problem: Chronic Conditions and Co-morbidities  Goal: Patient's chronic conditions and co-morbidity symptoms are monitored and maintained or improved  Outcome: Completed     Problem: Skin/Tissue Integrity  Goal: Absence of new skin breakdown  Description: 1. Monitor for areas of redness and/or skin breakdown  2. Assess vascular access sites hourly  3. Every 4-6 hours minimum:  Change oxygen saturation probe site  4. Every 4-6 hours:  If on nasal continuous positive airway pressure, respiratory therapy assess nares and determine need for appliance change or resting period.   Outcome: Completed

## 2022-08-20 LAB
ORGANISM: ABNORMAL
URINE CULTURE, ROUTINE: ABNORMAL

## (undated) DEVICE — FORCEPS BX OVL CUP FEN DISPOSABLE CAP L 160CM RAD JAW 4

## (undated) DEVICE — REAGENT TEST UREASE RAPD CLOTEST F/

## (undated) DEVICE — SPONGE GZ W4XL4IN RAYON POLY FILL CVR W/ NONWOVEN FAB

## (undated) DEVICE — BLOCK BITE 60FR RUBBER ADLT DENTAL

## (undated) DEVICE — GRADUATE TRIANG MEASURE 1000ML BLK PRNT